# Patient Record
Sex: FEMALE | Race: WHITE | NOT HISPANIC OR LATINO | Employment: OTHER | URBAN - METROPOLITAN AREA
[De-identification: names, ages, dates, MRNs, and addresses within clinical notes are randomized per-mention and may not be internally consistent; named-entity substitution may affect disease eponyms.]

---

## 2023-05-17 LAB
HEPATITIS B SURFACE ANTIGEN (EXTERNAL): NEGATIVE
HEPATITIS C ANTIBODY (EXTERNAL): NEGATIVE
HIV1+2 AB SERPL QL IA: NEGATIVE
RUBELLA ANTIBODY IGG (EXTERNAL): NEGATIVE

## 2023-09-18 ENCOUNTER — TRANSFERRED RECORDS (OUTPATIENT)
Dept: HEALTH INFORMATION MANAGEMENT | Facility: CLINIC | Age: 33
End: 2023-09-18

## 2023-10-05 ENCOUNTER — MEDICAL CORRESPONDENCE (OUTPATIENT)
Dept: HEALTH INFORMATION MANAGEMENT | Facility: CLINIC | Age: 33
End: 2023-10-05
Payer: COMMERCIAL

## 2023-10-09 ENCOUNTER — TRANSCRIBE ORDERS (OUTPATIENT)
Dept: MATERNAL FETAL MEDICINE | Facility: CLINIC | Age: 33
End: 2023-10-09

## 2023-10-09 DIAGNOSIS — O26.90 PREGNANCY RELATED CONDITION, ANTEPARTUM: Primary | ICD-10-CM

## 2023-10-10 ENCOUNTER — TRANSFERRED RECORDS (OUTPATIENT)
Dept: HEALTH INFORMATION MANAGEMENT | Facility: CLINIC | Age: 33
End: 2023-10-10
Payer: COMMERCIAL

## 2023-10-10 LAB — GROUP B STREPTOCOCCUS (EXTERNAL): NEGATIVE

## 2023-10-11 DIAGNOSIS — O09.299 HX OF POSTPARTUM HEMORRHAGE, CURRENTLY PREGNANT: ICD-10-CM

## 2023-10-11 DIAGNOSIS — Z98.891 HISTORY OF C-SECTION: ICD-10-CM

## 2023-10-11 DIAGNOSIS — I77.74 VERTEBRAL ARTERY DISSECTION (H): Primary | ICD-10-CM

## 2023-10-22 ENCOUNTER — HEALTH MAINTENANCE LETTER (OUTPATIENT)
Age: 33
End: 2023-10-22

## 2023-10-24 ENCOUNTER — PRE VISIT (OUTPATIENT)
Dept: MATERNAL FETAL MEDICINE | Facility: CLINIC | Age: 33
End: 2023-10-24
Payer: COMMERCIAL

## 2023-10-24 ENCOUNTER — HOSPITAL ENCOUNTER (OUTPATIENT)
Dept: ULTRASOUND IMAGING | Facility: CLINIC | Age: 33
Discharge: HOME | End: 2023-10-24
Attending: STUDENT IN AN ORGANIZED HEALTH CARE EDUCATION/TRAINING PROGRAM
Payer: COMMERCIAL

## 2023-10-24 ENCOUNTER — HOSPITAL ENCOUNTER (OUTPATIENT)
Facility: CLINIC | Age: 33
Discharge: HOME | End: 2023-10-24
Attending: OBSTETRICS & GYNECOLOGY | Admitting: OBSTETRICS & GYNECOLOGY
Payer: COMMERCIAL

## 2023-10-24 ENCOUNTER — OFFICE VISIT (OUTPATIENT)
Dept: MATERNAL FETAL MEDICINE | Facility: CLINIC | Age: 33
End: 2023-10-24
Attending: STUDENT IN AN ORGANIZED HEALTH CARE EDUCATION/TRAINING PROGRAM
Payer: COMMERCIAL

## 2023-10-24 VITALS — TEMPERATURE: 98.6 F | DIASTOLIC BLOOD PRESSURE: 94 MMHG | RESPIRATION RATE: 24 BRPM | SYSTOLIC BLOOD PRESSURE: 135 MMHG

## 2023-10-24 VITALS
OXYGEN SATURATION: 97 % | HEART RATE: 79 BPM | RESPIRATION RATE: 18 BRPM | DIASTOLIC BLOOD PRESSURE: 95 MMHG | SYSTOLIC BLOOD PRESSURE: 143 MMHG

## 2023-10-24 DIAGNOSIS — O13.3 GESTATIONAL HYPERTENSION, THIRD TRIMESTER: Primary | ICD-10-CM

## 2023-10-24 DIAGNOSIS — I77.74 VERTEBRAL ARTERY DISSECTION (H): ICD-10-CM

## 2023-10-24 DIAGNOSIS — Z98.891 HISTORY OF C-SECTION: ICD-10-CM

## 2023-10-24 DIAGNOSIS — O09.299 HX OF POSTPARTUM HEMORRHAGE, CURRENTLY PREGNANT: ICD-10-CM

## 2023-10-24 PROBLEM — Z36.89 ENCOUNTER FOR TRIAGE IN PREGNANT PATIENT: Status: ACTIVE | Noted: 2023-10-24

## 2023-10-24 LAB
ALBUMIN MFR UR ELPH: 6.4 MG/DL
ALBUMIN SERPL BCG-MCNC: 3.6 G/DL (ref 3.5–5.2)
ALP SERPL-CCNC: 134 U/L (ref 35–104)
ALT SERPL W P-5'-P-CCNC: 23 U/L (ref 0–50)
ANION GAP SERPL CALCULATED.3IONS-SCNC: 11 MMOL/L (ref 7–15)
AST SERPL W P-5'-P-CCNC: 23 U/L (ref 0–45)
BILIRUB SERPL-MCNC: 0.2 MG/DL
BUN SERPL-MCNC: 8 MG/DL (ref 6–20)
CALCIUM SERPL-MCNC: 9.2 MG/DL (ref 8.6–10)
CHLORIDE SERPL-SCNC: 104 MMOL/L (ref 98–107)
CREAT SERPL-MCNC: 0.73 MG/DL (ref 0.51–0.95)
CREAT UR-MCNC: 52.4 MG/DL
DEPRECATED HCO3 PLAS-SCNC: 22 MMOL/L (ref 22–29)
EGFRCR SERPLBLD CKD-EPI 2021: >90 ML/MIN/1.73M2
ERYTHROCYTE [DISTWIDTH] IN BLOOD BY AUTOMATED COUNT: 13.2 % (ref 10–15)
GLUCOSE SERPL-MCNC: 87 MG/DL (ref 70–99)
HCT VFR BLD AUTO: 39.1 % (ref 35–47)
HGB BLD-MCNC: 13.1 G/DL (ref 11.7–15.7)
MCH RBC QN AUTO: 30.4 PG (ref 26.5–33)
MCHC RBC AUTO-ENTMCNC: 33.5 G/DL (ref 31.5–36.5)
MCV RBC AUTO: 91 FL (ref 78–100)
PLATELET # BLD AUTO: 253 10E3/UL (ref 150–450)
POTASSIUM SERPL-SCNC: 3.9 MMOL/L (ref 3.4–5.3)
PROT SERPL-MCNC: 6.4 G/DL (ref 6.4–8.3)
PROT/CREAT 24H UR: 0.12 MG/MG CR (ref 0–0.2)
RBC # BLD AUTO: 4.31 10E6/UL (ref 3.8–5.2)
SODIUM SERPL-SCNC: 137 MMOL/L (ref 135–145)
WBC # BLD AUTO: 9.8 10E3/UL (ref 4–11)

## 2023-10-24 PROCEDURE — 99205 OFFICE O/P NEW HI 60 MIN: CPT | Mod: 25 | Performed by: STUDENT IN AN ORGANIZED HEALTH CARE EDUCATION/TRAINING PROGRAM

## 2023-10-24 PROCEDURE — 80053 COMPREHEN METABOLIC PANEL: CPT

## 2023-10-24 PROCEDURE — 85027 COMPLETE CBC AUTOMATED: CPT

## 2023-10-24 PROCEDURE — 59025 FETAL NON-STRESS TEST: CPT

## 2023-10-24 PROCEDURE — 76811 OB US DETAILED SNGL FETUS: CPT

## 2023-10-24 PROCEDURE — G0463 HOSPITAL OUTPT CLINIC VISIT: HCPCS | Mod: 25 | Performed by: STUDENT IN AN ORGANIZED HEALTH CARE EDUCATION/TRAINING PROGRAM

## 2023-10-24 PROCEDURE — 84156 ASSAY OF PROTEIN URINE: CPT

## 2023-10-24 PROCEDURE — 59025 FETAL NON-STRESS TEST: CPT | Mod: 26 | Performed by: OBSTETRICS & GYNECOLOGY

## 2023-10-24 PROCEDURE — G0463 HOSPITAL OUTPT CLINIC VISIT: HCPCS | Mod: 25

## 2023-10-24 PROCEDURE — 76811 OB US DETAILED SNGL FETUS: CPT | Mod: 26 | Performed by: STUDENT IN AN ORGANIZED HEALTH CARE EDUCATION/TRAINING PROGRAM

## 2023-10-24 PROCEDURE — 36415 COLL VENOUS BLD VENIPUNCTURE: CPT

## 2023-10-24 PROCEDURE — 99214 OFFICE O/P EST MOD 30 MIN: CPT | Mod: 25 | Performed by: OBSTETRICS & GYNECOLOGY

## 2023-10-24 RX ORDER — ASPIRIN 81 MG/1
81 TABLET ORAL DAILY
Status: ON HOLD | COMMUNITY
End: 2023-10-28

## 2023-10-24 RX ORDER — LIDOCAINE 40 MG/G
CREAM TOPICAL
Status: DISCONTINUED | OUTPATIENT
Start: 2023-10-24 | End: 2023-10-24 | Stop reason: HOSPADM

## 2023-10-24 RX ORDER — LACTOBACILLUS RHAMNOSUS GG 10B CELL
1 CAPSULE ORAL 2 TIMES DAILY
COMMUNITY

## 2023-10-24 RX ORDER — CALCIUM CARBONATE 500 MG/1
1 TABLET, CHEWABLE ORAL 2 TIMES DAILY
COMMUNITY

## 2023-10-24 RX ORDER — SERTRALINE HYDROCHLORIDE 100 MG/1
100 TABLET, FILM COATED ORAL DAILY
COMMUNITY

## 2023-10-24 ASSESSMENT — ACTIVITIES OF DAILY LIVING (ADL)
ADLS_ACUITY_SCORE: 33
ADLS_ACUITY_SCORE: 33

## 2023-10-24 NOTE — NURSING NOTE
Anna here for L2 ultrasound and MFM consult at 37w4d related to history of vertebral artery dissection and hx HELLP with  . Medications and allergies reviewed. Mild range BPs noted in office. See flowsheets. Dr. Sarah Brock and Dr. Olivares met with patient to discuss plan, see separate note.

## 2023-10-24 NOTE — PROGRESS NOTES
Antepartum History and Physical   2023  Anna Smith  [id number]      HPI: Anna Smith is a 33 year old  at 37w4d by 8w4d US who presents from [facility] clinic for elevated blood pressures.     Patient was seen in [facility] clinic today for consultation regarding possible recent vertebral artery dissection following chiropractic session after presenting to the ED with headaches and hypertension (). Today in clinic she was found to have additional elevated blood pressures concerning for gestational hypertension.     We were able to review with her today the emergency room visit in September with Dr. Matthews during which she was evaluated by stroke neurologist who stated that her MRA findings were artifactual and the CT angiogram showed no flap or thrombus but a small area of possible dissection in the right vertebral artery.  At that time the stroke neurologist recommended taking 81 mg of aspirin and no further treatment or alternative therapy.  They did not feel she necessarily needed neurology follow-up.  Unfortunately at that time they did not discuss whether pushing and labor or hypertension in labor might be of any concerns for her.  It is of note she was hypertensive at the time of that emergency room visit on 2023.  The stroke neurologist's name was Dr. Calle.    She feels nervous, but overall well.  Reports no recent headaches.. Denies vision changes, chest pain, shortness of breath. Endorses some episodic upper abdominal pain, but reports mostly feels musculoskeletal due to fetal positioning. No LE swelling.     Patient notes that at time she initially presented to ED at the time of potential dissection diagnosis with HA she was in significant pain which she attributes as the etiology for her elevated blood pressures. She additionally notes they normalized after receiving analgesics. She notes her home blood pressures have all been normotensive.     Her pregnancy has been  complicated by:  - H/o preeclampsia without severe features in a prior pregnancy  - Concerns for vertebral artery dissection s/p chiropractic adjustment in pregnancy   - H/o CS x1, NRFHT s/p epidural   - H/o vaccum-assisted   - H/o PPH     Please note that the patient reports on discussion today is significant amount of trauma related to her prior births.  In her first delivery she required an emergency  for preeclampsia with severe features and also required magnesium treatment.  She reports a significant lack of control and the inability to bond with her baby immediately after delivery as it was taken from her immediately.  This was due to the emergency nature of her delivery.  In her second pregnancy she had a successful vaginal birth, but had a 2 L postpartum hemorrhage and again her child was taken from her to allow for surgical treatment of her hemorrhage.    The patient very much feels that structures in the traditional healthcare system that are set up to keep her safe make her feel unsafe based on her 2 prior experiences.  Those were not at this hospital.  She is very much aware that there is a high chance she will need a hospital delivery but this causes significant anxiety for her and she definitely has a PTSD component to her experiences.    Prenatal Care:  Primary OB care this pregnancy has been with [facility].  She was made very aware by either midwifery team that there was a high chance she would require a transfer for hospital birth.    She has gone back and forth about what a hospital delivery would look like for her she is very worried about nausea during pregnancy as this was very difficult in her prior labors.  She is also worried about being awake for delivery and what emotional sentiments that might bring up for her.  This was to the point that she even debated having a repeat  under general anesthesia but we did talk about why this does not seem in my opinion to be  the best plan for working through her trauma.    OBHX:   OB History    Para Term  AB Living   4 2 1 1 1 2   SAB IAB Ectopic Multiple Live Births   1 0 0 0 2      # Outcome Date GA Lbr Ayan/2nd Weight Sex Delivery Anes PTL Lv   4 Current            3 Term 22 40w4d / 02:58 3.487 kg (7 lb 11 oz) M Vag-Vacuum EPI N CHRISSY      Complications: Hemorrhage      Name: ARPAN CISNEROS      Apgar1: 8  Apgar5: 9   2 SAB 2020           1  18     CS-LTranv   CHRISSY       MedicalHX:   Past Medical History:   Diagnosis Date    History of delivery by vacuum extraction, currently pregnant     History of postpartum hemorrhage     Vertebral artery dissection (H24)        SurgicalHX:   Past Surgical History:   Procedure Laterality Date     SECTION         Medications:   No current facility-administered medications on file prior to encounter.  aspirin 81 MG EC tablet, Take 81 mg by mouth daily  B Complex-C (VITAMIN B COMPLEX W/VITAMIN C) TABS tablet, Take 1 tablet by mouth daily  cholecalciferol (VITAMIN D3) 125 mcg (5000 units) capsule, Take 125 mcg by mouth daily  lactobacillus rhamnosus, GG, (CULTURELL) capsule, Take 1 capsule by mouth 2 times daily  Prenatal Vit-Fe Fumarate-FA (PRENATAL MULTIVITAMIN  PLUS IRON) 27-1 MG TABS, Take 1 tablet by mouth daily  sertraline (ZOLOFT) 100 MG tablet, Take 100 mg by mouth daily  calcium carbonate (TUMS) 500 MG chewable tablet, Take 1 chew tab by mouth 2 times daily        Allergies:  Allergies   Allergen Reactions    Azithromycin Hives     Severity: Moderate; Type: Drug Allergy;    Erythromycin Hives    Latex Hives       FamilyHX:  History reviewed. No pertinent family history.    SocialHX:   Social History     Socioeconomic History    Marital status:      Spouse name: None    Number of children: None    Years of education: None    Highest education level: None   Tobacco Use    Smoking status: Never    Smokeless tobacco: Never   Substance and  Sexual Activity    Alcohol use: Not Currently    Drug use: Never    Sexual activity: Yes     Partners: Male       ROS: 10-point ROS negative except as indicated in HPI.    Physical Exam:  Vitals:    10/24/23 1644 10/24/23 1659 10/24/23 1714 10/24/23 1729   BP: 137/85 129/80 (!) 138/95 (!) 134/92   Resp: 24      Temp: 98.6  F (37  C)      TempSrc: Oral        General: alert, oriented female, resting in bed in NAD  CV: regular rate and rhythm, normal s1 and s2, no murmurs  Lungs: clear bilaterally, no crackles or wheezes  Abdomen: soft, gravid, non-tender  Extremities: bilateral lower extremities non-tender with trace edema    FHT: baseline 140,variability, +accelerations, no decelerations  [place]: Trace contractions     Prenatal ultrasounds:  PREGNANCY  ---------------------------------------------------------------------------------------------------------  Bhatia pregnancy. Number of fetuses: 1        DATING  ---------------------------------------------------------------------------------------------------------                                           Date                                Details                                                                                      Gest. age                      MARCOS  LMP                                  2/4/2023                                                                                                                           37 w + 3 d                     11/11/2023  Prior assessment               6/27/2023                         GA: 20 w + 2 d                                                                          37 w + 2 d                     11/12/2023  U/S                                   10/24/2023                       based upon AC, BPD, Femur, HC                                                37 w + 0 d                     11/14/2023  Assigned dating                  Dating performed on 10/24/2023, based on the LMP                                                             37 w + 3 d                     11/11/2023        GENERAL EVALUATION  ---------------------------------------------------------------------------------------------------------  Cardiac activity present.  bpm.  Fetal movements present.  Presentation cephalic.  Placenta Anterior, No Previa, > 2 cm from internal os. marginal cord insertion.  Umbilical cord 3 vessel cord.  Amniotic fluid Amount of AF: normal. MVP 5.7 cm.        FETAL BIOMETRY  ---------------------------------------------------------------------------------------------------------  Main Fetal Biometry:  BPD                                        96.1                    mm                         39w 2d                Hadlock  OFD                                        112.3                  mm                          34w 2d                Nicolaides  HC                                          329.2                  mm                          37w 3d                Hadlock  Cerebellum tr                            47.3                   mm                          -/-                Nicolaides  AC                                          318.2                  mm                          35w 5d        19%        Hadlock  Femur                                      68.8                   mm                          35w 2d                Hadlock  Humerus                                  59.3                    mm                         34w 3d                Lisa  Fetal Weight Calculation:  EFW                                       2,871                  g                                     28%        Hadlock  EFW (lb,oz)                             6 lb 5                  oz  EFW by                                        Hadlock (BPD-HC-AC-FL)  Head / Face / Neck Biometry:                                             5.7                     mm  CM                                          7.0                      mm  Nasal bone                               12.7                   mm  Urinary Tract Biometry:  Rt Renal pelvis ap                     1.3                     mm  Lt Renal pelvis ap                     1.2                      mm        FETAL ANATOMY  ---------------------------------------------------------------------------------------------------------  The following structures appear normal:  Head / Neck                         Cranium. Head size. Head shape. Lateral ventricles. Choroid plexus. Midline falx. Cavum septi pellucidi. Cerebellum. Cisterna magna.                                             Parenchyma. Thalami. Vermis.                                             Neck.  Face                                   Lips. Nose. Maxilla. Mandible. Orbits. Lens.  Heart / Thorax                      RVOT view. LVOT view. Situs. Bicaval view. Superior vena cava. Inferior vena cava. Cardiac position. Cardiac size. Cardiac rhythm.  Abdomen                             Abdominal wall. Cord insertion. Stomach. Kidneys. Bladder. Liver. Bowel.  Spine                                  Cervical spine. Thoracic spine. Lumbar spine.  Extremities / Skeleton          Right arm. Left hand. Right leg. Right foot. Left leg. Left foot.     The following structures could not be adequately visualized:  Face                                   Profile.  Heart / Thorax                      4-chamber view. Aortic arch view. Ductal arch view. 3-vessel view. 3-vessel-trachea view.                                             Right lung. Left lung. Diaphragm.  Spine                                  Sacral spine.  Extremities / Skeleton          Right hand. Left arm.     The following structures could not be visualized:  Abdomen                             Genitals.        MATERNAL STRUCTURES  ---------------------------------------------------------------------------------------------------------  Cervix                                   "Visualized                                             Appearance: Appears Closed                                             Approach - Transabdominal: Cervical length 41.2 mm  Right Ovary                          Not visualized  Left Ovary                            Not visualized    Prenatal Labs:      Lab Results   Component Value Date    HGB 13.1 10/24/2023       GBS Status:   No results found for: \"GBS\"    No results found for: \"PAP\"    Labs:   Results for orders placed or performed during the hospital encounter of 10/24/23 (from the past 24 hour(s))   CBC with platelets   Result Value Ref Range    WBC Count 9.8 4.0 - 11.0 10e3/uL    RBC Count 4.31 3.80 - 5.20 10e6/uL    Hemoglobin 13.1 11.7 - 15.7 g/dL    Hematocrit 39.1 35.0 - 47.0 %    MCV 91 78 - 100 fL    MCH 30.4 26.5 - 33.0 pg    MCHC 33.5 31.5 - 36.5 g/dL    RDW 13.2 10.0 - 15.0 %    Platelet Count 253 150 - 450 10e3/uL     Assessment: 33 year old  at 37w4d by 8w4d US, here for elevated blood pressures.     Plan:    # Gestational Hypertension   # H/o Preeclampsia w/o severe features  Transferred to L&D triage for evaluation the setting of elevated blood pressures   - Elevated pressures noted > 4 hours apart.  - HELLP labs nl, UPC 0.12  - Patient at this time meets criteria for gestational hypertension and meets criteria for delivery.     # C/f Vertebral artery dissection   Presented to ED with persistent HA and HTN s/p chiropractic adjustment   - Has not followed with neurology in this pregnancy. However per chart review, ED provider discussed findings with neurology faculty. [place] no need for further neurology follow up (Refer to 23 note for more information).    # Fetal well-being:  - Category I FHT  - GBS negative (per [facility] records    # PNC:     Patient seen and care plan discussed under supervision of Dr. Peck. Findings signed out to faculty and night team for further care management.     Sidra Mcgovern MD, MPH, MS  Obstetrics, " Gynecology & Women's Health   Resident, PGY-2  10/24/2023 5:46 PM      Attending attestation  I personally spent 30 minutes in face-to-face counseling and consultation with this patient today.  I reviewed her desires and preferences for this delivery in the context of the objective information that we have right now.  This information is as follows: She has had persistent mild elevations in her blood pressure which are concerning and most likely diagnostic for gestational hypertension.  She has lab evaluation which shows no evidence of help syndrome or preeclampsia with severe features.  This is a good thing.  However given her gestational age we do recommend that delivery be facilitated.  I have indicated to her that I strongly recommend delivery because she is at or after 37 weeks with new onset hypertension in pregnancy.  Her delivery preferences are complicated by the fact that we do not have additional follow-up by neurologist within our system to evaluate whether she can push during labor.  Unfortunately this was not addressed at the time that she was in the ER not surprisingly.  Therefore I discussed with her that prior to supporting her pushing during labor I would like to run her case by a neurologist and that will be difficult at this time of night.  But this would be preferred for me to allow her to proceed with a vaginal birth.  If our neurologist did not feel that this was safe for her, we would recommend a repeat  section and we talked through what that might look like.  She states the operating room is not surprisingly extremely traumatic for her based on her  and her postpartum hemorrhage, and we talked about things such as her wearing sunglasses for the bright lights and possibly listening to music or having aromatherapy.  She stated she might want a general anesthetic to avoid having to experience the trauma but I did encourage her strongly that the safest option might be regional  anesthetic with her partner in the room, possibly her  if she has 1, and allowing for immediate skin to skin with the baby at the time of delivery.  She is open to considering this.    After lengthy shared decision making the patient acknowledged our recommendation to stay and undergo induction of labor today but ultimately has opted to return home, to check her blood pressure both tonight and tomorrow morning, and to call our charge nurse in the morning if she is amenable to coming in for an induction of labor.  When she presents in the morning we will contact our neurology team to see if they need to see her in person or if they can make recommendations based on the documentation in her chart.  She is aware she can come back at any point day or night if her circumstances change or if she changes her mind.  She is aware that I would like her to stay in the hospital as I worry about the unpredictable progression of gestational hypertension.  However I do respect her decision and appreciate her willingness to return in the morning.    At this time the patient will plan for discharge home we will give her contact information for labor and delivery and she will anticipate return in the morning for an induction of labor, or  if otherwise indicated.    Sanna Peck MD

## 2023-10-25 ENCOUNTER — ANESTHESIA (OUTPATIENT)
Dept: OBGYN | Facility: CLINIC | Age: 33
End: 2023-10-25
Payer: COMMERCIAL

## 2023-10-25 ENCOUNTER — HOSPITAL ENCOUNTER (INPATIENT)
Facility: CLINIC | Age: 33
LOS: 3 days | Discharge: HOME | End: 2023-10-28
Attending: OBSTETRICS & GYNECOLOGY | Admitting: OBSTETRICS & GYNECOLOGY
Payer: COMMERCIAL

## 2023-10-25 ENCOUNTER — ANESTHESIA EVENT (OUTPATIENT)
Dept: OBGYN | Facility: CLINIC | Age: 33
End: 2023-10-25
Payer: COMMERCIAL

## 2023-10-25 ENCOUNTER — APPOINTMENT (OUTPATIENT)
Dept: CT IMAGING | Facility: CLINIC | Age: 33
End: 2023-10-25
Payer: COMMERCIAL

## 2023-10-25 DIAGNOSIS — Z98.891 S/P CESAREAN SECTION: Primary | ICD-10-CM

## 2023-10-25 DIAGNOSIS — O13.3 GESTATIONAL HYPERTENSION, THIRD TRIMESTER: ICD-10-CM

## 2023-10-25 PROBLEM — O13.9 GESTATIONAL HYPERTENSION: Status: ACTIVE | Noted: 2023-10-25

## 2023-10-25 LAB
ABO/RH(D): NORMAL
ANTIBODY SCREEN: NEGATIVE
ERYTHROCYTE [DISTWIDTH] IN BLOOD BY AUTOMATED COUNT: 13.2 % (ref 10–15)
HCT VFR BLD AUTO: 40 % (ref 35–47)
HGB BLD-MCNC: 13.6 G/DL (ref 11.7–15.7)
MCH RBC QN AUTO: 30.8 PG (ref 26.5–33)
MCHC RBC AUTO-ENTMCNC: 34 G/DL (ref 31.5–36.5)
MCV RBC AUTO: 91 FL (ref 78–100)
PLATELET # BLD AUTO: 264 10E3/UL (ref 150–450)
RBC # BLD AUTO: 4.42 10E6/UL (ref 3.8–5.2)
SARS-COV-2 RNA RESP QL NAA+PROBE: NEGATIVE
SPECIMEN EXPIRATION DATE: NORMAL
T PALLIDUM AB SER QL: NONREACTIVE
WBC # BLD AUTO: 9.7 10E3/UL (ref 4–11)

## 2023-10-25 PROCEDURE — 258N000003 HC RX IP 258 OP 636

## 2023-10-25 PROCEDURE — C9290 INJ, BUPIVACAINE LIPOSOME: HCPCS | Performed by: STUDENT IN AN ORGANIZED HEALTH CARE EDUCATION/TRAINING PROGRAM

## 2023-10-25 PROCEDURE — 360N000076 HC SURGERY LEVEL 3, PER MIN: Performed by: OBSTETRICS & GYNECOLOGY

## 2023-10-25 PROCEDURE — 272N000001 HC OR GENERAL SUPPLY STERILE: Performed by: OBSTETRICS & GYNECOLOGY

## 2023-10-25 PROCEDURE — 86780 TREPONEMA PALLIDUM: CPT

## 2023-10-25 PROCEDURE — 250N000011 HC RX IP 250 OP 636: Mod: JZ | Performed by: OBSTETRICS & GYNECOLOGY

## 2023-10-25 PROCEDURE — 250N000009 HC RX 250

## 2023-10-25 PROCEDURE — 120N000002 HC R&B MED SURG/OB UMMC

## 2023-10-25 PROCEDURE — 99223 1ST HOSP IP/OBS HIGH 75: CPT | Mod: 25 | Performed by: OBSTETRICS & GYNECOLOGY

## 2023-10-25 PROCEDURE — C9803 HOPD COVID-19 SPEC COLLECT: HCPCS

## 2023-10-25 PROCEDURE — 258N000003 HC RX IP 258 OP 636: Performed by: STUDENT IN AN ORGANIZED HEALTH CARE EDUCATION/TRAINING PROGRAM

## 2023-10-25 PROCEDURE — 86901 BLOOD TYPING SEROLOGIC RH(D): CPT

## 2023-10-25 PROCEDURE — 87635 SARS-COV-2 COVID-19 AMP PRB: CPT

## 2023-10-25 PROCEDURE — 70498 CT ANGIOGRAPHY NECK: CPT | Mod: 26 | Performed by: RADIOLOGY

## 2023-10-25 PROCEDURE — 85027 COMPLETE CBC AUTOMATED: CPT

## 2023-10-25 PROCEDURE — 999N000141 HC STATISTIC PRE-PROCEDURE NURSING ASSESSMENT: Performed by: OBSTETRICS & GYNECOLOGY

## 2023-10-25 PROCEDURE — 88304 TISSUE EXAM BY PATHOLOGIST: CPT | Mod: 26 | Performed by: PATHOLOGY

## 2023-10-25 PROCEDURE — 250N000011 HC RX IP 250 OP 636: Performed by: STUDENT IN AN ORGANIZED HEALTH CARE EDUCATION/TRAINING PROGRAM

## 2023-10-25 PROCEDURE — 250N000009 HC RX 250: Performed by: OBSTETRICS & GYNECOLOGY

## 2023-10-25 PROCEDURE — 710N000010 HC RECOVERY PHASE 1, LEVEL 2, PER MIN: Performed by: OBSTETRICS & GYNECOLOGY

## 2023-10-25 PROCEDURE — 250N000011 HC RX IP 250 OP 636: Mod: JZ | Performed by: STUDENT IN AN ORGANIZED HEALTH CARE EDUCATION/TRAINING PROGRAM

## 2023-10-25 PROCEDURE — 0UB50ZZ EXCISION OF RIGHT FALLOPIAN TUBE, OPEN APPROACH: ICD-10-PCS | Performed by: OBSTETRICS & GYNECOLOGY

## 2023-10-25 PROCEDURE — 271N000001 HC OR GENERAL SUPPLY NON-STERILE: Performed by: OBSTETRICS & GYNECOLOGY

## 2023-10-25 PROCEDURE — 59025 FETAL NON-STRESS TEST: CPT | Mod: 26 | Performed by: OBSTETRICS & GYNECOLOGY

## 2023-10-25 PROCEDURE — 70498 CT ANGIOGRAPHY NECK: CPT

## 2023-10-25 PROCEDURE — 88304 TISSUE EXAM BY PATHOLOGIST: CPT | Mod: TC

## 2023-10-25 PROCEDURE — 250N000009 HC RX 250: Performed by: STUDENT IN AN ORGANIZED HEALTH CARE EDUCATION/TRAINING PROGRAM

## 2023-10-25 PROCEDURE — 59514 CESAREAN DELIVERY ONLY: CPT | Mod: GC | Performed by: OBSTETRICS & GYNECOLOGY

## 2023-10-25 PROCEDURE — 99254 IP/OBS CNSLTJ NEW/EST MOD 60: CPT | Performed by: STUDENT IN AN ORGANIZED HEALTH CARE EDUCATION/TRAINING PROGRAM

## 2023-10-25 PROCEDURE — 250N000013 HC RX MED GY IP 250 OP 250 PS 637

## 2023-10-25 PROCEDURE — 70496 CT ANGIOGRAPHY HEAD: CPT | Mod: 26 | Performed by: RADIOLOGY

## 2023-10-25 PROCEDURE — 370N000017 HC ANESTHESIA TECHNICAL FEE, PER MIN: Performed by: OBSTETRICS & GYNECOLOGY

## 2023-10-25 PROCEDURE — 36415 COLL VENOUS BLD VENIPUNCTURE: CPT

## 2023-10-25 PROCEDURE — 250N000011 HC RX IP 250 OP 636

## 2023-10-25 RX ORDER — KETOROLAC TROMETHAMINE 30 MG/ML
30 INJECTION, SOLUTION INTRAMUSCULAR; INTRAVENOUS EVERY 6 HOURS
Status: DISPENSED | OUTPATIENT
Start: 2023-10-25 | End: 2023-10-26

## 2023-10-25 RX ORDER — ACETAMINOPHEN 325 MG/1
975 TABLET ORAL ONCE
Status: COMPLETED | OUTPATIENT
Start: 2023-10-25 | End: 2023-10-25

## 2023-10-25 RX ORDER — FENTANYL CITRATE 50 UG/ML
INJECTION, SOLUTION INTRAMUSCULAR; INTRAVENOUS
Status: COMPLETED | OUTPATIENT
Start: 2023-10-25 | End: 2023-10-25

## 2023-10-25 RX ORDER — NALOXONE HYDROCHLORIDE 0.4 MG/ML
0.4 INJECTION, SOLUTION INTRAMUSCULAR; INTRAVENOUS; SUBCUTANEOUS
Status: DISCONTINUED | OUTPATIENT
Start: 2023-10-25 | End: 2023-10-28 | Stop reason: HOSPADM

## 2023-10-25 RX ORDER — ONDANSETRON 2 MG/ML
4 INJECTION INTRAMUSCULAR; INTRAVENOUS EVERY 6 HOURS PRN
Status: DISCONTINUED | OUTPATIENT
Start: 2023-10-25 | End: 2023-10-25 | Stop reason: HOSPADM

## 2023-10-25 RX ORDER — ONDANSETRON 4 MG/1
4 TABLET, ORALLY DISINTEGRATING ORAL EVERY 6 HOURS PRN
Status: DISCONTINUED | OUTPATIENT
Start: 2023-10-25 | End: 2023-10-28 | Stop reason: HOSPADM

## 2023-10-25 RX ORDER — METOCLOPRAMIDE 10 MG/1
10 TABLET ORAL EVERY 6 HOURS PRN
Status: DISCONTINUED | OUTPATIENT
Start: 2023-10-25 | End: 2023-10-28 | Stop reason: HOSPADM

## 2023-10-25 RX ORDER — MISOPROSTOL 200 UG/1
800 TABLET ORAL
Status: DISCONTINUED | OUTPATIENT
Start: 2023-10-25 | End: 2023-10-25 | Stop reason: HOSPADM

## 2023-10-25 RX ORDER — OXYTOCIN/0.9 % SODIUM CHLORIDE 30/500 ML
340 PLASTIC BAG, INJECTION (ML) INTRAVENOUS CONTINUOUS PRN
Status: DISCONTINUED | OUTPATIENT
Start: 2023-10-25 | End: 2023-10-25 | Stop reason: HOSPADM

## 2023-10-25 RX ORDER — NALOXONE HYDROCHLORIDE 0.4 MG/ML
0.2 INJECTION, SOLUTION INTRAMUSCULAR; INTRAVENOUS; SUBCUTANEOUS
Status: DISCONTINUED | OUTPATIENT
Start: 2023-10-25 | End: 2023-10-28 | Stop reason: HOSPADM

## 2023-10-25 RX ORDER — BUPIVACAINE HYDROCHLORIDE 7.5 MG/ML
2 INJECTION, SOLUTION EPIDURAL; RETROBULBAR ONCE
Status: DISCONTINUED | OUTPATIENT
Start: 2023-10-25 | End: 2023-10-25 | Stop reason: HOSPADM

## 2023-10-25 RX ORDER — MISOPROSTOL 200 UG/1
800 TABLET ORAL
Status: DISCONTINUED | OUTPATIENT
Start: 2023-10-25 | End: 2023-10-28 | Stop reason: HOSPADM

## 2023-10-25 RX ORDER — SODIUM CHLORIDE, SODIUM LACTATE, POTASSIUM CHLORIDE, CALCIUM CHLORIDE 600; 310; 30; 20 MG/100ML; MG/100ML; MG/100ML; MG/100ML
INJECTION, SOLUTION INTRAVENOUS CONTINUOUS PRN
Status: DISCONTINUED | OUTPATIENT
Start: 2023-10-25 | End: 2023-10-25

## 2023-10-25 RX ORDER — CEFAZOLIN SODIUM/WATER 2 G/20 ML
2 SYRINGE (ML) INTRAVENOUS
Status: DISCONTINUED | OUTPATIENT
Start: 2023-10-25 | End: 2023-10-25 | Stop reason: HOSPADM

## 2023-10-25 RX ORDER — NALBUPHINE HYDROCHLORIDE 20 MG/ML
2.5-5 INJECTION, SOLUTION INTRAMUSCULAR; INTRAVENOUS; SUBCUTANEOUS EVERY 6 HOURS PRN
Status: DISCONTINUED | OUTPATIENT
Start: 2023-10-25 | End: 2023-10-28 | Stop reason: HOSPADM

## 2023-10-25 RX ORDER — OXYTOCIN 10 [USP'U]/ML
10 INJECTION, SOLUTION INTRAMUSCULAR; INTRAVENOUS
Status: DISCONTINUED | OUTPATIENT
Start: 2023-10-25 | End: 2023-10-25 | Stop reason: HOSPADM

## 2023-10-25 RX ORDER — BISACODYL 10 MG
10 SUPPOSITORY, RECTAL RECTAL DAILY PRN
Status: DISCONTINUED | OUTPATIENT
Start: 2023-10-27 | End: 2023-10-28 | Stop reason: HOSPADM

## 2023-10-25 RX ORDER — OXYTOCIN 10 [USP'U]/ML
10 INJECTION, SOLUTION INTRAMUSCULAR; INTRAVENOUS
Status: DISCONTINUED | OUTPATIENT
Start: 2023-10-25 | End: 2023-10-28 | Stop reason: HOSPADM

## 2023-10-25 RX ORDER — FENTANYL CITRATE 50 UG/ML
15 INJECTION, SOLUTION INTRAMUSCULAR; INTRAVENOUS ONCE
Status: DISCONTINUED | OUTPATIENT
Start: 2023-10-25 | End: 2023-10-25 | Stop reason: HOSPADM

## 2023-10-25 RX ORDER — ONDANSETRON 4 MG/1
4 TABLET, ORALLY DISINTEGRATING ORAL EVERY 6 HOURS PRN
Status: DISCONTINUED | OUTPATIENT
Start: 2023-10-25 | End: 2023-10-25 | Stop reason: HOSPADM

## 2023-10-25 RX ORDER — LIDOCAINE 40 MG/G
CREAM TOPICAL
Status: DISCONTINUED | OUTPATIENT
Start: 2023-10-25 | End: 2023-10-25 | Stop reason: HOSPADM

## 2023-10-25 RX ORDER — CARBOPROST TROMETHAMINE 250 UG/ML
250 INJECTION, SOLUTION INTRAMUSCULAR
Status: DISCONTINUED | OUTPATIENT
Start: 2023-10-25 | End: 2023-10-25 | Stop reason: HOSPADM

## 2023-10-25 RX ORDER — FENTANYL CITRATE-0.9 % NACL/PF 10 MCG/ML
100 PLASTIC BAG, INJECTION (ML) INTRAVENOUS EVERY 5 MIN PRN
Status: DISCONTINUED | OUTPATIENT
Start: 2023-10-25 | End: 2023-10-25 | Stop reason: HOSPADM

## 2023-10-25 RX ORDER — TRANEXAMIC ACID 10 MG/ML
1 INJECTION, SOLUTION INTRAVENOUS EVERY 30 MIN PRN
Status: DISCONTINUED | OUTPATIENT
Start: 2023-10-25 | End: 2023-10-28 | Stop reason: HOSPADM

## 2023-10-25 RX ORDER — BUPIVACAINE HYDROCHLORIDE 7.5 MG/ML
INJECTION, SOLUTION INTRASPINAL
Status: COMPLETED | OUTPATIENT
Start: 2023-10-25 | End: 2023-10-25

## 2023-10-25 RX ORDER — MODIFIED LANOLIN
OINTMENT (GRAM) TOPICAL
Status: DISCONTINUED | OUTPATIENT
Start: 2023-10-25 | End: 2023-10-28 | Stop reason: HOSPADM

## 2023-10-25 RX ORDER — AMOXICILLIN 250 MG
1 CAPSULE ORAL 2 TIMES DAILY
Status: DISCONTINUED | OUTPATIENT
Start: 2023-10-25 | End: 2023-10-28 | Stop reason: HOSPADM

## 2023-10-25 RX ORDER — IBUPROFEN 800 MG/1
800 TABLET, FILM COATED ORAL EVERY 6 HOURS
Status: DISCONTINUED | OUTPATIENT
Start: 2023-10-26 | End: 2023-10-28 | Stop reason: HOSPADM

## 2023-10-25 RX ORDER — DEXTROSE, SODIUM CHLORIDE, SODIUM LACTATE, POTASSIUM CHLORIDE, AND CALCIUM CHLORIDE 5; .6; .31; .03; .02 G/100ML; G/100ML; G/100ML; G/100ML; G/100ML
INJECTION, SOLUTION INTRAVENOUS CONTINUOUS
Status: DISCONTINUED | OUTPATIENT
Start: 2023-10-25 | End: 2023-10-28 | Stop reason: HOSPADM

## 2023-10-25 RX ORDER — KETOROLAC TROMETHAMINE 30 MG/ML
INJECTION, SOLUTION INTRAMUSCULAR; INTRAVENOUS PRN
Status: DISCONTINUED | OUTPATIENT
Start: 2023-10-25 | End: 2023-10-25

## 2023-10-25 RX ORDER — BUPIVACAINE HYDROCHLORIDE 2.5 MG/ML
INJECTION, SOLUTION EPIDURAL; INFILTRATION; INTRACAUDAL
Status: COMPLETED | OUTPATIENT
Start: 2023-10-25 | End: 2023-10-25

## 2023-10-25 RX ORDER — GLYCOPYRROLATE 0.2 MG/ML
INJECTION, SOLUTION INTRAMUSCULAR; INTRAVENOUS PRN
Status: DISCONTINUED | OUTPATIENT
Start: 2023-10-25 | End: 2023-10-25

## 2023-10-25 RX ORDER — DEXMEDETOMIDINE HYDROCHLORIDE 4 UG/ML
INJECTION, SOLUTION INTRAVENOUS PRN
Status: DISCONTINUED | OUTPATIENT
Start: 2023-10-25 | End: 2023-10-25

## 2023-10-25 RX ORDER — BUPIVACAINE HYDROCHLORIDE 2.5 MG/ML
INJECTION, SOLUTION EPIDURAL; INFILTRATION; INTRACAUDAL
Status: DISCONTINUED | OUTPATIENT
Start: 2023-10-25 | End: 2023-10-25

## 2023-10-25 RX ORDER — AMOXICILLIN 250 MG
2 CAPSULE ORAL 2 TIMES DAILY
Status: DISCONTINUED | OUTPATIENT
Start: 2023-10-25 | End: 2023-10-28 | Stop reason: HOSPADM

## 2023-10-25 RX ORDER — MORPHINE SULFATE 1 MG/ML
INJECTION, SOLUTION EPIDURAL; INTRATHECAL; INTRAVENOUS
Status: COMPLETED | OUTPATIENT
Start: 2023-10-25 | End: 2023-10-25

## 2023-10-25 RX ORDER — ACETAMINOPHEN 325 MG/1
975 TABLET ORAL EVERY 6 HOURS
Status: DISCONTINUED | OUTPATIENT
Start: 2023-10-25 | End: 2023-10-28 | Stop reason: HOSPADM

## 2023-10-25 RX ORDER — LIDOCAINE 40 MG/G
CREAM TOPICAL
Status: DISCONTINUED | OUTPATIENT
Start: 2023-10-25 | End: 2023-10-28 | Stop reason: HOSPADM

## 2023-10-25 RX ORDER — METHYLERGONOVINE MALEATE 0.2 MG/ML
200 INJECTION INTRAVENOUS
Status: DISCONTINUED | OUTPATIENT
Start: 2023-10-25 | End: 2023-10-25 | Stop reason: HOSPADM

## 2023-10-25 RX ORDER — MISOPROSTOL 200 UG/1
400 TABLET ORAL
Status: DISCONTINUED | OUTPATIENT
Start: 2023-10-25 | End: 2023-10-25 | Stop reason: HOSPADM

## 2023-10-25 RX ORDER — HYDROCORTISONE 25 MG/G
CREAM TOPICAL 3 TIMES DAILY PRN
Status: DISCONTINUED | OUTPATIENT
Start: 2023-10-25 | End: 2023-10-28 | Stop reason: HOSPADM

## 2023-10-25 RX ORDER — CITRIC ACID/SODIUM CITRATE 334-500MG
30 SOLUTION, ORAL ORAL
Status: COMPLETED | OUTPATIENT
Start: 2023-10-25 | End: 2023-10-25

## 2023-10-25 RX ORDER — METOCLOPRAMIDE HYDROCHLORIDE 5 MG/ML
10 INJECTION INTRAMUSCULAR; INTRAVENOUS EVERY 6 HOURS PRN
Status: DISCONTINUED | OUTPATIENT
Start: 2023-10-25 | End: 2023-10-28 | Stop reason: HOSPADM

## 2023-10-25 RX ORDER — CARBOPROST TROMETHAMINE 250 UG/ML
250 INJECTION, SOLUTION INTRAMUSCULAR
Status: DISCONTINUED | OUTPATIENT
Start: 2023-10-25 | End: 2023-10-28 | Stop reason: HOSPADM

## 2023-10-25 RX ORDER — METHYLERGONOVINE MALEATE 0.2 MG/ML
200 INJECTION INTRAVENOUS
Status: DISCONTINUED | OUTPATIENT
Start: 2023-10-25 | End: 2023-10-28 | Stop reason: HOSPADM

## 2023-10-25 RX ORDER — OXYTOCIN/0.9 % SODIUM CHLORIDE 30/500 ML
340 PLASTIC BAG, INJECTION (ML) INTRAVENOUS CONTINUOUS PRN
Status: DISCONTINUED | OUTPATIENT
Start: 2023-10-25 | End: 2023-10-28 | Stop reason: HOSPADM

## 2023-10-25 RX ORDER — SODIUM CHLORIDE, SODIUM LACTATE, POTASSIUM CHLORIDE, CALCIUM CHLORIDE 600; 310; 30; 20 MG/100ML; MG/100ML; MG/100ML; MG/100ML
INJECTION, SOLUTION INTRAVENOUS CONTINUOUS
Status: DISCONTINUED | OUTPATIENT
Start: 2023-10-25 | End: 2023-10-25 | Stop reason: HOSPADM

## 2023-10-25 RX ORDER — ONDANSETRON 2 MG/ML
4 INJECTION INTRAMUSCULAR; INTRAVENOUS EVERY 6 HOURS PRN
Status: DISCONTINUED | OUTPATIENT
Start: 2023-10-25 | End: 2023-10-28 | Stop reason: HOSPADM

## 2023-10-25 RX ORDER — OXYTOCIN/0.9 % SODIUM CHLORIDE 30/500 ML
PLASTIC BAG, INJECTION (ML) INTRAVENOUS CONTINUOUS PRN
Status: DISCONTINUED | OUTPATIENT
Start: 2023-10-25 | End: 2023-10-25

## 2023-10-25 RX ORDER — CEFAZOLIN SODIUM/WATER 2 G/20 ML
2 SYRINGE (ML) INTRAVENOUS SEE ADMIN INSTRUCTIONS
Status: DISCONTINUED | OUTPATIENT
Start: 2023-10-25 | End: 2023-10-25 | Stop reason: HOSPADM

## 2023-10-25 RX ORDER — OXYTOCIN/0.9 % SODIUM CHLORIDE 30/500 ML
100-340 PLASTIC BAG, INJECTION (ML) INTRAVENOUS CONTINUOUS PRN
Status: DISCONTINUED | OUTPATIENT
Start: 2023-10-25 | End: 2023-10-28 | Stop reason: HOSPADM

## 2023-10-25 RX ORDER — PROCHLORPERAZINE 25 MG
25 SUPPOSITORY, RECTAL RECTAL EVERY 12 HOURS PRN
Status: DISCONTINUED | OUTPATIENT
Start: 2023-10-25 | End: 2023-10-28 | Stop reason: HOSPADM

## 2023-10-25 RX ORDER — PROCHLORPERAZINE MALEATE 10 MG
10 TABLET ORAL EVERY 6 HOURS PRN
Status: DISCONTINUED | OUTPATIENT
Start: 2023-10-25 | End: 2023-10-28 | Stop reason: HOSPADM

## 2023-10-25 RX ORDER — MISOPROSTOL 200 UG/1
400 TABLET ORAL
Status: DISCONTINUED | OUTPATIENT
Start: 2023-10-25 | End: 2023-10-28 | Stop reason: HOSPADM

## 2023-10-25 RX ORDER — SIMETHICONE 80 MG
80 TABLET,CHEWABLE ORAL 4 TIMES DAILY PRN
Status: DISCONTINUED | OUTPATIENT
Start: 2023-10-25 | End: 2023-10-28 | Stop reason: HOSPADM

## 2023-10-25 RX ORDER — TRANEXAMIC ACID 10 MG/ML
1 INJECTION, SOLUTION INTRAVENOUS EVERY 30 MIN PRN
Status: DISCONTINUED | OUTPATIENT
Start: 2023-10-25 | End: 2023-10-25 | Stop reason: HOSPADM

## 2023-10-25 RX ORDER — MORPHINE SULFATE 1 MG/ML
150 INJECTION, SOLUTION EPIDURAL; INTRATHECAL; INTRAVENOUS ONCE
Status: DISCONTINUED | OUTPATIENT
Start: 2023-10-25 | End: 2023-10-25 | Stop reason: HOSPADM

## 2023-10-25 RX ORDER — IOPAMIDOL 755 MG/ML
100 INJECTION, SOLUTION INTRAVASCULAR ONCE
Status: COMPLETED | OUTPATIENT
Start: 2023-10-25 | End: 2023-10-25

## 2023-10-25 RX ADMIN — SODIUM CHLORIDE, POTASSIUM CHLORIDE, SODIUM LACTATE AND CALCIUM CHLORIDE: 600; 310; 30; 20 INJECTION, SOLUTION INTRAVENOUS at 20:02

## 2023-10-25 RX ADMIN — FENTANYL CITRATE 15 MCG: 50 INJECTION INTRAMUSCULAR; INTRAVENOUS at 20:11

## 2023-10-25 RX ADMIN — Medication 2 G: at 20:05

## 2023-10-25 RX ADMIN — SODIUM CHLORIDE, POTASSIUM CHLORIDE, SODIUM LACTATE AND CALCIUM CHLORIDE 100 ML/HR: 600; 310; 30; 20 INJECTION, SOLUTION INTRAVENOUS at 19:12

## 2023-10-25 RX ADMIN — BUPIVACAINE HYDROCHLORIDE IN DEXTROSE 1.8 ML: 7.5 INJECTION, SOLUTION SUBARACHNOID at 20:11

## 2023-10-25 RX ADMIN — IOPAMIDOL 67 ML: 755 INJECTION, SOLUTION INTRAVENOUS at 17:08

## 2023-10-25 RX ADMIN — ONDANSETRON 4 MG: 2 INJECTION INTRAMUSCULAR; INTRAVENOUS at 20:05

## 2023-10-25 RX ADMIN — Medication 100 ML/HR: at 21:52

## 2023-10-25 RX ADMIN — MORPHINE SULFATE 0.15 MG: 1 INJECTION EPIDURAL; INTRATHECAL; INTRAVENOUS at 20:11

## 2023-10-25 RX ADMIN — PHENYLEPHRINE HYDROCHLORIDE 50 MCG/MIN: 10 INJECTION INTRAVENOUS at 20:02

## 2023-10-25 RX ADMIN — BUPIVACAINE 20 ML: 13.3 INJECTION, SUSPENSION, LIPOSOMAL INFILTRATION at 21:40

## 2023-10-25 RX ADMIN — BUPIVACAINE HYDROCHLORIDE 20 ML: 2.5 INJECTION, SOLUTION EPIDURAL; INFILTRATION; INTRACAUDAL at 21:40

## 2023-10-25 RX ADMIN — SODIUM CHLORIDE, POTASSIUM CHLORIDE, SODIUM LACTATE AND CALCIUM CHLORIDE 500 ML: 600; 310; 30; 20 INJECTION, SOLUTION INTRAVENOUS at 18:40

## 2023-10-25 RX ADMIN — KETOROLAC TROMETHAMINE 30 MG: 30 INJECTION, SOLUTION INTRAMUSCULAR at 21:11

## 2023-10-25 RX ADMIN — SODIUM CITRATE AND CITRIC ACID MONOHYDRATE 30 ML: 500; 334 SOLUTION ORAL at 19:56

## 2023-10-25 RX ADMIN — GLYCOPYRROLATE 0.2 MG: 0.2 INJECTION, SOLUTION INTRAMUSCULAR; INTRAVENOUS at 20:05

## 2023-10-25 RX ADMIN — DEXMEDETOMIDINE 20 MCG: 100 INJECTION, SOLUTION, CONCENTRATE INTRAVENOUS at 21:08

## 2023-10-25 RX ADMIN — Medication 600 ML/HR: at 20:35

## 2023-10-25 RX ADMIN — DEXMEDETOMIDINE 20 MCG: 100 INJECTION, SOLUTION, CONCENTRATE INTRAVENOUS at 20:18

## 2023-10-25 RX ADMIN — SODIUM CHLORIDE 80 ML: 9 INJECTION, SOLUTION INTRAVENOUS at 17:09

## 2023-10-25 RX ADMIN — ACETAMINOPHEN 975 MG: 325 TABLET, FILM COATED ORAL at 19:12

## 2023-10-25 ASSESSMENT — ACTIVITIES OF DAILY LIVING (ADL)
ADLS_ACUITY_SCORE: 35
ADLS_ACUITY_SCORE: 20

## 2023-10-25 NOTE — ANESTHESIA PREPROCEDURE EVALUATION
Anesthesia Pre-Procedure Evaluation    Patient: Diane Smith   MRN: [id number] : 1990        Procedure : Procedure(s):   section          Past Medical History:   Diagnosis Date    History of delivery by vacuum extraction, currently pregnant     History of postpartum hemorrhage     Vertebral artery dissection (H24)       Past Surgical History:   Procedure Laterality Date     SECTION        Allergies   Allergen Reactions    Azithromycin Hives     Severity: Moderate; Type: Drug Allergy;    Erythromycin Hives    Latex Hives      Social History     Tobacco Use    Smoking status: Never    Smokeless tobacco: Never   Substance Use Topics    Alcohol use: Not Currently      Wt Readings from Last 1 Encounters:   No data found for Wt        Anesthesia Evaluation   Pt has had prior anesthetic. Type: General and OB Labor Epidural.    History of anesthetic complications  - PONV.      ROS/MED HX  ENT/Pulmonary:  - neg pulmonary ROS     Neurologic: Comment:   # Per chart, ongoing issues with neck pain and muscle spasms, requiring tylenol, flexeril, and few ED visits.   # Concern for Vertebral Artery Dissection secondary to chiropractic visit.  Based on unclear imaging, recommendation made to proceed with   # CTA of neck:  1. Focal narrowing of the right vertebral artery lumen at the V2-3 junction  image 324 series 6 may represent localized non-flow-limiting dissection. MRA  neck dissection protocol can be obtained for further evaluation.  2. No evidence of proximal artery occlusion, high grade stenosis, aneurysm, or  vascular malformation.  3. Final report per neurointerventional radiology service. [TP]         Cardiovascular: Comment: Multiple elevated blood pressure readings during this hospitalization.  Unclear if it is due to HTN or PTSD associated with previous hospitalizations  Hx/o HELLP  in prior regnancy   Per chart review, BP consistently elevated       METS/Exercise Tolerance:    "  Hematologic:  - neg hematologic  ROS     Musculoskeletal:       GI/Hepatic:  - neg GI/hepatic ROS     Renal/Genitourinary:       Endo:  - neg endo ROS     Psychiatric/Substance Use: Comment:   Procedural/situational anxiety - Patient reports traumatic experiences with deliveres in setting of 1st delivery resulting in a STAT csection, and then her second delivery via vaginal delivery but complicated with retained placenta requiring transfer to operating room. -- Reports PTSD but no nightmares.     (+) psychiatric history (PTSD with both prior deliveries) other (comment) and anxiety       Infectious Disease:       Malignancy:       Other:      (+)  , ,previous  (C Section under general), TOLAC candidate (complicated by post partum hemmoraghe)         Physical Exam    Airway  airway exam normal           Respiratory Devices and Support         Dental           Cardiovascular   cardiovascular exam normal          Pulmonary   pulmonary exam normal                OUTSIDE LABS:  CBC:   Lab Results   Component Value Date    WBC 9.8 10/24/2023    HGB 13.1 10/24/2023    HCT 39.1 10/24/2023     10/24/2023     BMP:   Lab Results   Component Value Date     10/24/2023    POTASSIUM 3.9 10/24/2023    CHLORIDE 104 10/24/2023    CO2 22 10/24/2023    BUN 8.0 10/24/2023    CR 0.73 10/24/2023    GLC 87 10/24/2023     COAGS: No results found for: \"PTT\", \"INR\", \"FIBR\"  POC: No results found for: \"BGM\", \"HCG\", \"HCGS\"  HEPATIC:   Lab Results   Component Value Date    ALBUMIN 3.6 10/24/2023    PROTTOTAL 6.4 10/24/2023    ALT 23 10/24/2023    AST 23 10/24/2023    ALKPHOS 134 (H) 10/24/2023    BILITOTAL 0.2 10/24/2023     OTHER:   Lab Results   Component Value Date    LOWELL 9.2 10/24/2023       Anesthesia Plan    ASA Status:  3    NPO Status:  Will be NPO Appropriate at ... 10/25/2023 8:00 PM   Anesthesia Type: Spinal.              Consents    Anesthesia Plan(s) and associated risks, benefits, and realistic alternatives " discussed. Questions answered and patient/representative(s) expressed understanding.     - Discussed: Risks, Benefits and Alternatives for BOTH SEDATION and the PROCEDURE were discussed     - Discussed with:  Patient      - Extended Intubation/Ventilatory Support Discussed: No.      - Patient is DNR/DNI Status: No     Use of blood products discussed: Yes.     - Discussed with: Patient.     - Consented: consented to blood products            Reason for refusal: other.     Postoperative Care    Pain management: Neuraxial analgesia, intrathecal morphine, Peripheral nerve block (Single Shot).   PONV prophylaxis: Ondansetron (or other 5HT-3)     Comments:           neg OB ROS.       Sree Altman MD

## 2023-10-25 NOTE — PROGRESS NOTES
"Please see \"Imaging\" tab under \"Chart Review\" for details of today's visit.    Sarah Brock    "

## 2023-10-25 NOTE — PLAN OF CARE
Goal Outcome Evaluation:  Pt declines Dr Peck' recommendation to stay at hospital for IOL. Pt wants to go home and came back tomorrow. Pt has unit L&D phone number; she will call to schedule her IOL tomorrow morning. Discharge instructions given. Pt states understanding of instructions. Discharge home at 1955

## 2023-10-25 NOTE — CONSULTS
[facility]  Neurology Consultation    Patient Name:  Diane Smith  MRN:  [id number]    :  1990  Date of Service:  2023  Primary care provider:  Ivett Cook      Neurology consultation service was asked to see Diane Smith by Dr. Peck to evaluate for vertebral dissection.    Chief Complaint:  neck pain, resolved prior to admission    History of Present Illness:   Diane Smith is a 33 year old female 37W5D who presents for delivery.  She did have neck pain and an outside angiogram of her neck that was read as having a possible vertebral artery dissection.  Neurology was consulted to comment on whether a vertebral artery dissection would influence her ability to push during labor, or whether a  section should be performed to reduce the amount of intrathoracic/intracranial pressure.    4-5 weeks ago she had been having some tension around her upper back, shoulder blades, and her midwife team recommended a consultation/evaluation with a chiropractor.  During this visit the chiropractor adjusted her cervical spine which was a surprise to the patient-she had not thought that this was going to happen and suspected that she was still in the evaluation rather than the treatment portion of her visit.  After this adjustment the location and nature of her neck pain change.  It then was focused on the right side of the neck, seemed to pulsate and rhythm with her heart, so she went to another chiropractor's office for advice.  He recommended she present to an emergency department where an MR angiogram was performed showing a small amount of focal narrowing at the right V2/V3 junction on 2023.  A CT angio was performed the following day which did not show evidence of a vertebral artery dissection.    Her neck pain continued for about 10 days, then self resolved.  It is not returned.  She was taking a low-dose aspirin throughout this time  "both for the vertebral artery dissection as well as for her preeclampsia.    ROS  A comprehensive ROS was performed and pertinent findings were included in HPI.     PMH  Past Medical History:   Diagnosis Date    History of delivery by vacuum extraction, currently pregnant     History of postpartum hemorrhage     Vertebral artery dissection (H24)      Past Surgical History:   Procedure Laterality Date     SECTION         Medications   I have personally reviewed the patient's medication list.     Allergies  I have personally reviewed the patient's allergy list.     Social History  Social History     Socioeconomic History    Marital status:      Spouse name: Not on file    Number of children: Not on file    Years of education: Not on file    Highest education level: Not on file   Occupational History    Not on file   Tobacco Use    Smoking status: Never    Smokeless tobacco: Never   Substance and Sexual Activity    Alcohol use: Not Currently    Drug use: Never    Sexual activity: Yes     Partners: Male   Other Topics Concern    Not on file   Social History Narrative    Not on file     Social Determinants of Health     Financial Resource Strain: Not on file   Food Insecurity: Not on file   Transportation Needs: Not on file   Physical Activity: Not on file   Stress: Not on file   Social Connections: Not on file   Interpersonal Safety: Not on file   Housing Stability: Not on file        Family History    History reviewed. No pertinent family history.       Physical Examination   Vitals: /85 (BP Location: Left arm, Patient Position: Semi-Leonard's, Cuff Size: Adult Regular)   Temp 98.8  F (37.1  C) (Oral)   Resp 20   Ht 1.613 m (5' 3.5\")   Wt 76.7 kg (169 lb)   LMP 2023   BMI 29.47 kg/m    General: Lying in bed, NAD  Head: NC/AT  Eyes: no icterus, op pink and moist  Cardiac: RRR. Extremities warm, no edema.   Respiratory: non-labored on RA  GI: S/NT/ND  Skin: No rash or lesion on exposed " skin  Psych: Mood pleasant, affect congruent  Neuro:  Mental status: Awake, alert, attentive, oriented to self, time, place, and circumstance. Language is fluent and coherent with intact comprehension of complex commands, naming and repetition.  Cranial nerves: VFF, PERRL, conjugate gaze, EOMI, facial sensation intact, face symmetric, shoulder shrug strong, tongue/uvula midline, no dysarthria.   Motor: Normal bulk and tone. No abnormal movements. 5/5 strength bilaterally in deltoids, biceps, triceps, hand , hip flexors, hip extensors, knee flexion, knee extension, plantarflexion, dorsiflexion.   Reflexes: Brisk throughout with positive Ward bilaterally, no clonus, and toes are downgoing.  Patellar reflexes can easily be elicited superior to the patella bilaterally with light finger tapping, same goes for the upper extremities.  Sensory: Intact to light touch in proximal and distal aspects of all 4 extremities   Coordination: FNF and HS without ataxia or dysmetria. Rapid alternating movements intact.   Gait: Deferred    Investigations   I have personally reviewed pertinent labs, tests, and radiological imaging. Discussion of notable findings is included under Impression.     CTA head neck 10/25/2023  Impression:    1. Neck CTA demonstrates no evidence of vertebral artery dissection.  No stenosis or aneurysm.  2. Head CTA demonstrates no evidence of vertebral artery dissection.  No stenosis of the major intracranial arteries.     Was patient transferred from outside hospital?   No    Impression  #Query vertebral artery dissection on the right  #No evidence of vertebral artery dissection on CTA head or neck    Neurology was asked to see Ms. Smith regarding whether she may have an ongoing vertebral artery dissection that would inform her risk of vaginal delivery versus  section versus associated anesthetic interventions.  We repeated her CTA of the head and neck to assess for any change, though no  dissection is visible on this imaging.    My suspicion is high for imaging artifact leading to the original visualization of vertebral artery narrowing on the MR angio performed .  Given that there is no evidence of a dissection, from a neurologic perspective she is not in any particularly elevated risk for either vaginal delivery,  section, or associated anesthesia procedures.    Recommendations  -No current indications for aspirin from the perspective of arterial dissection (though I understand aspirin may continue to be recommended for other OB/GYN/MFM indications)  - No current indications for repeat head or neck imaging  - Neurology will sign off at this time, please call if any new questions arise    Thank you for involving Neurology in the care of Diane Smith.  Please do not hesitate to call with questions.    Gurinder Meza MD    I spent 70 minutes on this consultation including discussion with the primary maternal-fetal medicine team, evaluation of the patient, discussion with the patient and her , review of past records including MRI and CT imaging of her head and neck.

## 2023-10-25 NOTE — PLAN OF CARE
Goal Outcome Evaluation:  Data: Patient presented to [facility] at 1628. Reason for maternal/fetal assessment per patient is Rule Out Pre-eclampsia  Patient is a . Prenatal record reviewed.      OB History    Para Term  AB Living   4 2 1 1 1 2   SAB IAB Ectopic Multiple Live Births   1 0 0 0 2      # Outcome Date GA Lbr Ayan/2nd Weight Sex Delivery Anes PTL Lv   4 Current            3 Term 22 40w4d / 02:58 3.487 kg (7 lb 11 oz) M Vag-Vacuum EPI N CHRISSY      Complications: Hemorrhage      Name: ARPAN CISNEROS      Apgar1: 8  Apgar5: 9   2 SAB 2020           1  18     CS-LTranv   CHRISSY   . Medical history:   Past Medical History:   Diagnosis Date    History of delivery by vacuum extraction, currently pregnant     History of postpartum hemorrhage     Vertebral artery dissection (H24)    . Gestational Age 37w4d. VSS. Fetal movement present. Patient denies backache, vaginal discharge, pelvic pressure, UTI symptoms, GI problems, bloody show, vaginal bleeding, edema, headache, visual disturbances, epigastric or URQ pain, abdominal pain, rupture of membranes.  Action: Verbal consent for EFM. Triage assessment completed. EFM applied for monitoring. Uterine assessment irreg. Fetal assessment: Presumed adequate fetal oxygenation documented (see flow record).   Response: Dr. Peck informed of pt arrival and status.

## 2023-10-25 NOTE — PROGRESS NOTES
Data: Patient presented to [facility] at 1406.   Reason for maternal/fetal assessment per patient is Gestational Hypertension  .  Patient is a . Prenatal record reviewed.      OB History    Para Term  AB Living   4 2 1 1 1 2   SAB IAB Ectopic Multiple Live Births   1 0 0 0 2      # Outcome Date GA Lbr Ayan/2nd Weight Sex Delivery Anes PTL Lv   4 Current            3 Term 22 40w4d / 02:58 3.487 kg (7 lb 11 oz) M Vag-Vacuum EPI N CHRISSY      Complications: Hemorrhage      Name: ARPAN CISNEROS      Apgar1: 8  Apgar5: 9   2 SAB 2020           1  18     CS-LTranv   CHRISSY   . Medical history:   Past Medical History:   Diagnosis Date    History of delivery by vacuum extraction, currently pregnant     History of postpartum hemorrhage     Vertebral artery dissection (H24)    . Gestational Age 37w5d. Pt arrived to unit for delivery for gHTN.  Pt needs a head and neck CT scan to determine if she can push. If she can push, she can get an induction. If she can not, the plan is to proceed with a . VSS. Fetal movement present. Patient denies cramping, backache, vaginal discharge, pelvic pressure, UTI symptoms, GI problems, bloody show, vaginal bleeding, edema, headache, visual disturbances, epigastric or URQ pain, abdominal pain, rupture of membranes. Support persons, Jonh (), is present at bedside.   Action: Verbal consent for EFM. Triage assessment completed. EFM applied. Fetal assessment: Presumed adequate fetal oxygenation documented (see flow record). Morrison shows infrequent contractions.   Response:  arrived to bedside to speak with patient. Patient verbalized that she would prefer a  at this time instead of an induction. Pt says she feels more comfortable with receiving a  at this time. Plan is to move forward with a .

## 2023-10-25 NOTE — DISCHARGE INSTRUCTIONS
Discharge Instruction for Undelivered Patients      You were seen for:  Rule out Pre-Eclampsia  We Consulted: Dr Peck  You had (Test or Medicine):Labs fetal monitroring    Call your provider if you notice:  Swelling in your face or increased swelling in your hands or legs.  Headaches that are not relieved by Tylenol (acetaminophen).  Changes in your vision (blurring: seeing spots or stars.)  Nausea (sick to your stomach) and vomiting (throwing up).   Weight gain of 5 pounds or more per week.  Heartburn that doesn't go away.  Signs of bladder infection: pain when you urinate (use the toilet), need to go more often and more urgently.  The bag of garcía (rupture of membranes) breaks, or you notice leaking in your underwear.  Bright red blood in your underwear.  Abdominal (lower belly) or stomach pain.  For first baby: Contractions (tightening) less than 5 minutes apart for one hour or more.  Second (plus) baby: Contractions (tightening) less than 10 minutes apart and getting stronger.  *If less than 34 weeks: Contractions (tightening) more than 6 times in one hour.  Increase or change in vaginal discharge (note the color and amount)    Follow-up:  Come to hospital for IOL 10/25; please call before coming to the hospital. [phone]

## 2023-10-25 NOTE — PROVIDER NOTIFICATION
10/25/23 1444   Provider Notification   Provider Name/Title    Method of Notification Electronic Page   Notification Reason Patient Arrived     Pt arrived for likely IOL for gHTN. 37w5d. .

## 2023-10-25 NOTE — H&P
[facility]  OBGYN History and Physical       Name: Diane Smith MRN# [id number]   YOB: 1990 Age: 33 year old      Date of Admission: 10/25/2023    Primary care provider: Ivett Cook      Assessment and Plan:       33 year old  at 37w5d by LMP c/w 8w4d US who presents today for delivery. Initially presented for induction of labor, however given the complexity of her previous deliveries opted to move forward with a planned  section.     This patient was evaluated on 10/24/2023 for an M consultation at which time she was determined to carry a new diagnosis of gestational hypertension.  Given her gestational age of greater than 37 weeks delivery was recommended.  After shared decision making the patient opted for patient directed discharge as she was very much caught off guard by her diagnosis and the recommendation.  Please see her full note from yesterday for additional details    # Repeat  Section anticipated, patient request  # H/o  Section, fetal bradycardia with traumatic birth and PTSD  # H/o vac-assisted , with traumatic birth and PTSD  # H/o PPH c/b D&C   Moving forward with unscheduled repeat  section for maternal request.. Prior  completed STAT in the setting of fetal bradycardia s/p epidural placement. Unable to receive previous operative note. However based on records procedure PLTCS. Will precede forward with Pfannenstiel skin incision with low transverse hysterotomy  - Labs: CBC, TSP    - Pre-op Hgb 13.1, Plts 253  - Placenta: Anterior with marginal cord insertion  - Anesthesia: Spinal  - 2g Ancef   - PPH Meds/Ppx: Will plan to avoid methergine  - Diet: NPO  - PPx: SCDs  - Consent: Discussed risks and benefits of procedure, including but not limited to bleeding, infection, injury to surrounding organs, injury to infant, and the potential need for another surgery should some injury  go unrecognized or patient were to have continued bleeding. Patient had time to ask questions and expressed understanding of procedure and associated risks. Agreed to blood transfusion if necessary. Consent signed.  Last ate small bowel of cereal around 1130 am - will discuss with anesthesia when CS can occur  -discussed case with Dr. Li, OB/Gyn surgeon for delivery    # gHTN  Meet criteria for gestational hypertension as of 10/24.  No evidence of preeclampsia.  -  Serial BP monitoring   - IV Antihypertensives prn for sustained severe range blood pressures (>160/>110), none present at this time  - Labs: HELLP nl, UPC 0.24 (10/24) we will repeat labs given plan for surgical delivery later today.  - Daily weights, strict I&Os    #Possible vertebral dissection  - reviewed prior ED visit, unclear diagnosis and no recommendations for delivery  - neurology consultation prior to CS   - may need imaging will defer to neurology for imaging and to see if ongoing low dose aspirin is also recommended    # FWB:   Cat 1 tracing, 130 baseline; cephalic by US in MFM yesterday;    - NICU for delivery due to plan for C/S      #PTSD/anxiety  - discussed triggers during delivery given PTSD from prior births - music if wanted, sunglasses for bright OR lights,  at side, aromatherapy  - will discuss with anesthesia her primary trigger which is nausea and vomiting for pre-treatment before spinal, will give Zofran now as well as IVF  - discussed risk of PP depression and need for close surveillance - unplanned pregnancy and notes has had trouble connecting with this pregnancy, discussed option for referral to behavioral health with  focus if desired, including inpatient  - continue PTA medications      I personally saw and examined this patient. I agree with plan for admission and delivery for gestational hypertension at 37 weeks.       Physician Attestation   I, Sanna Peck MD, saw this patient.I personally  reviewed vital signs, medications, and labs and examined her and discussed the plan with her and her .     Key findings: Gestational hypertension, mild at 37+ weeks. Prior C/S desires repeat. Obstetric PTSD/trauma, support as needed.      Sanna Peck MD  Date of Service (when I saw the patient): .10/25/23     Time Spent on this Encounter   I personally spent a total of 30 minutes, including both face-to-face and non-face-to-face on the date of the admission, addressing the above diagnosis. Activities performed in this time include chart review, obtaining / reviewing history, performing a medically necessary evaluation, documentation and Charge activities: counseling and care coordination, equivalent to medical decision making that is of moderate complexity.                       HPI:     Diane Smith is a 33 year old  at 37w5d  who presents today for delivery.     Pregnancy notable for:   Diagnosis of potential vertebral artery dissection on ER evaluation 2023.  Imaging at that time along with curbside consult with stroke neurologist suspected likely artifactual findings on MRA and CT angiogram with potential tiny area of dissection for which no substantial follow-up has been recommended with neurology.  She was continued on low-dose aspirin.  No comment on route of delivery or safety of pushing at delivery.  This happened after her very first visit to a chiropractor with neck manipulation.  She has no residual neck pain or deficits related to the suspected dissection.  There is no family history of of vascular dissection.    Her previous  delivery was notable for delivery in the late  period due to preeclampsia with severe features.  This was very traumatic for her and she did require magnesium and separation from her child..  Although she did not have preeclampsia in her second delivery, which was a TOLAC at term, she ended up with an operative vaginal delivery and a  "postpartum hemorrhage which required surgical intervention.  She was again  from her infant and her .  She has very significant trauma from these 2 deliveries and has been doing lots of work related to this.    She reports normal fetal movement, no headaches vision changes or epigastric pain.  She has had no contractions or vaginal bleeding or leaking of fluid.    Patient has been NPO since 1130 this morning when she had a small amount of cereal to treat her nausea.    The patient was seen yesterday at which time she was diagnosed with gestational hypertension and those records were reviewed today.  She had a lengthy discussion with her midwife at [facility] since that time and has decided that after considering the options for induction of labor and repeat  section, that she feels like the right decision for her is a  section.  Her  is with her today and is supportive and at the bedside.    OB History:    OB History    Para Term  AB Living   4 2 1 1 1 2   SAB IAB Ectopic Multiple Live Births   1 0 0 0 2      # Outcome Date GA Lbr Ayan/2nd Weight Sex Delivery Anes PTL Lv   4 Current            3 Term 22 40w4d / 02:58 3.487 kg (7 lb 11 oz) M Vag-Vacuum EPI N CHRISSY      Complications: Hemorrhage      Name: ARPAN CISNEROS      Apgar1: 8  Apgar5: 9   2 SAB            1  18     CS-LTranv   CHRISSY        Prenatal Lab Results:  Lab Results   Component Value Date    HGB 13.1 10/24/2023       GBS Status:   No results found for: \"GBS\"             Past Medical History:     Past Medical History:   Diagnosis Date    History of delivery by vacuum extraction, currently pregnant     History of postpartum hemorrhage     Vertebral artery dissection (H24)              Past Surgical History:     Past Surgical History:   Procedure Laterality Date     SECTION               Social History:     Social History     Tobacco Use    Smoking " status: Never    Smokeless tobacco: Never   Substance Use Topics    Alcohol use: Not Currently             Family History:   History reviewed. No pertinent family history.          Immunizations:     Immunization History   Administered Date(s) Administered    COVID-19 12+ (2023-24) (MODERNA) 10/10/2023    COVID-19 Monovalent 18+ (Moderna) 04/22/2021, 05/19/2021, 12/01/2021            Allergies:     Allergies   Allergen Reactions    Azithromycin Hives     Severity: Moderate; Type: Drug Allergy;    Erythromycin Hives    Latex Hives             Medications:     Medications Prior to Admission   Medication Sig Dispense Refill Last Dose    aspirin 81 MG EC tablet Take 81 mg by mouth daily   10/24/2023    B Complex-C (VITAMIN B COMPLEX W/VITAMIN C) TABS tablet Take 1 tablet by mouth daily   10/25/2023 at 0800    calcium carbonate (TUMS) 500 MG chewable tablet Take 1 chew tab by mouth 2 times daily   10/24/2023 at 0800    cholecalciferol (VITAMIN D3) 125 mcg (5000 units) capsule Take 125 mcg by mouth daily   10/24/2023 at 2145    lactobacillus rhamnosus, GG, (CULTURELL) capsule Take 1 capsule by mouth 2 times daily   10/25/2023 at 0800    Prenatal Vit-Fe Fumarate-FA (PRENATAL MULTIVITAMIN  PLUS IRON) 27-1 MG TABS Take 1 tablet by mouth daily   10/24/2023 at 2145    sertraline (ZOLOFT) 100 MG tablet Take 100 mg by mouth daily   10/24/2023 at 2145             Review of Systems & Physical Exam:     The Review of Systems is negative other than noted in the HPI      /85 (BP Location: Left arm, Patient Position: Semi-Leonard's, Cuff Size: Adult Regular)   Temp 98.8  F (37.1  C) (Oral)   Resp 20   LMP 02/04/2023   Gen: alert and oriented, no apparent distress  CV: RRR, nl S1/S2, no murmurs/clicks/gallops  Lungs: CTAB, non-labored breathing  Abd: Gravid, non-tender, non-distended  Ext: trace peripheral extremity edema; 1+ DTRs, no clonus    Presentation: cephalic by US on 10/24/23.  Estimated Fetal Weight: 2.8 kg, 28%.      FHT:  Monitoring External  FHT: Baseline 130 bpm; moderate variability; accels present; no decelerations  TOCO rare contractions , irregular         Data:        Lab Results   Component Value Date     10/24/2023    Lab Results   Component Value Date    CHLORIDE 104 10/24/2023    Lab Results   Component Value Date    BUN 8.0 10/24/2023      Lab Results   Component Value Date    POTASSIUM 3.9 10/24/2023    Lab Results   Component Value Date    CO2 22 10/24/2023    Lab Results   Component Value Date    CR 0.73 10/24/2023        Lab Results   Component Value Date    WBC 9.8 10/24/2023    HGB 13.1 10/24/2023    HCT 39.1 10/24/2023    MCV 91 10/24/2023     10/24/2023     Lab Results   Component Value Date    CR 0.73 10/24/2023     Lab Results   Component Value Date     10/24/2023    POTASSIUM 3.9 10/24/2023    CHLORIDE 104 10/24/2023    CO2 22 10/24/2023    GLC 87 10/24/2023     Lab Results   Component Value Date    GLC 87 10/24/2023     Lab Results   Component Value Date    HGB 13.1 10/24/2023     Lab Results   Component Value Date    WBC 9.8 10/24/2023    Urine protein creatinine ratio on 10/24/23 < 0.3     Sidra Mcgovern MD, MPH, MS  Obstetrics, Gynecology & Women's Health   Resident, PGY-2  10/25/2023 3:35 PM        Physician Attestation   I saw this patient with the resident and agree with the resident/fellow's findings and plan of care as documented in the note.  I personally saw and examined this patient and spent 45 minutes in face-to-face counseling, consultation and examination.  She is well-known to me from her visit yesterday and so no significant chart review was required.    Key findings: Patient with prior  section and PTSD from traumatic birth now at 37 weeks gestation with gestational hypertension and recommendation for delivery.  Overall patient is doing well with reasonable control of her PTSD symptoms and has opted for a surgical delivery via repeat  section which we  support.  Her fetal assessment is reassuring.  We will await clearance and any potential imaging from neurology to determine what next steps are required for moving forward with her  which will likely be undertaken after she is cleared from meeting for anesthesia.  Anesthesia has also been notified of her admission.    Medical complexity over the past 24 hours:  - Decision regarding EMERGENCY MAJOR SURGERY  Decision for how best to support complex trauma related to prior medical procedures  Decision on safety with diagnosis of gestational hypertension  Decision on evaluation for vertebral artery dissection history during pregnancy.    I have personally reviewed the following data over the past 24 hrs:    9.8  \   13.1   / 253     137 104 8.0 /  87   3.9 22 0.73 \     ALT: 23 AST: 23 AP: 134 (H) TBILI: 0.2   ALB: 3.6 TOT PROTEIN: 6.4 LIPASE: N/A         Sanna Peck MD  Date of Service (when I saw the patient): 10/25/23

## 2023-10-26 LAB — HGB BLD-MCNC: 11 G/DL (ref 11.7–15.7)

## 2023-10-26 PROCEDURE — 36415 COLL VENOUS BLD VENIPUNCTURE: CPT

## 2023-10-26 PROCEDURE — 250N000013 HC RX MED GY IP 250 OP 250 PS 637

## 2023-10-26 PROCEDURE — 99232 SBSQ HOSP IP/OBS MODERATE 35: CPT | Performed by: OBSTETRICS & GYNECOLOGY

## 2023-10-26 PROCEDURE — 120N000002 HC R&B MED SURG/OB UMMC

## 2023-10-26 PROCEDURE — 85018 HEMOGLOBIN: CPT

## 2023-10-26 PROCEDURE — 250N000011 HC RX IP 250 OP 636: Mod: JZ

## 2023-10-26 PROCEDURE — 250N000011 HC RX IP 250 OP 636: Performed by: STUDENT IN AN ORGANIZED HEALTH CARE EDUCATION/TRAINING PROGRAM

## 2023-10-26 RX ORDER — OXYCODONE HYDROCHLORIDE 5 MG/1
5 TABLET ORAL EVERY 4 HOURS PRN
Status: DISCONTINUED | OUTPATIENT
Start: 2023-10-26 | End: 2023-10-26

## 2023-10-26 RX ORDER — OXYCODONE HYDROCHLORIDE 5 MG/1
5-10 TABLET ORAL EVERY 4 HOURS PRN
Status: DISCONTINUED | OUTPATIENT
Start: 2023-10-26 | End: 2023-10-28 | Stop reason: HOSPADM

## 2023-10-26 RX ORDER — HYDROMORPHONE HCL IN WATER/PF 6 MG/30 ML
0.2 PATIENT CONTROLLED ANALGESIA SYRINGE INTRAVENOUS
Status: DISCONTINUED | OUTPATIENT
Start: 2023-10-26 | End: 2023-10-28 | Stop reason: HOSPADM

## 2023-10-26 RX ORDER — LIDOCAINE 4 G/G
2 PATCH TOPICAL
Status: DISCONTINUED | OUTPATIENT
Start: 2023-10-26 | End: 2023-10-28 | Stop reason: HOSPADM

## 2023-10-26 RX ORDER — ASPIRIN 81 MG/1
81 TABLET ORAL DAILY
Status: DISCONTINUED | OUTPATIENT
Start: 2023-10-26 | End: 2023-10-26

## 2023-10-26 RX ORDER — SERTRALINE HYDROCHLORIDE 100 MG/1
100 TABLET, FILM COATED ORAL DAILY
Status: DISCONTINUED | OUTPATIENT
Start: 2023-10-26 | End: 2023-10-28 | Stop reason: HOSPADM

## 2023-10-26 RX ADMIN — NALBUPHINE HYDROCHLORIDE 2.5 MG: 20 INJECTION, SOLUTION INTRAMUSCULAR; INTRAVENOUS; SUBCUTANEOUS at 03:12

## 2023-10-26 RX ADMIN — SIMETHICONE 80 MG: 80 TABLET, CHEWABLE ORAL at 08:45

## 2023-10-26 RX ADMIN — ACETAMINOPHEN 975 MG: 325 TABLET, FILM COATED ORAL at 01:18

## 2023-10-26 RX ADMIN — ASPIRIN 81 MG: 81 TABLET, COATED ORAL at 14:56

## 2023-10-26 RX ADMIN — OXYCODONE HYDROCHLORIDE 5 MG: 5 TABLET ORAL at 22:29

## 2023-10-26 RX ADMIN — ACETAMINOPHEN 975 MG: 325 TABLET, FILM COATED ORAL at 22:29

## 2023-10-26 RX ADMIN — KETOROLAC TROMETHAMINE 30 MG: 30 INJECTION, SOLUTION INTRAMUSCULAR; INTRAVENOUS at 03:04

## 2023-10-26 RX ADMIN — SENNOSIDES AND DOCUSATE SODIUM 2 TABLET: 50; 8.6 TABLET ORAL at 19:39

## 2023-10-26 RX ADMIN — KETOROLAC TROMETHAMINE 30 MG: 30 INJECTION, SOLUTION INTRAMUSCULAR; INTRAVENOUS at 09:15

## 2023-10-26 RX ADMIN — IBUPROFEN 800 MG: 800 TABLET ORAL at 17:50

## 2023-10-26 RX ADMIN — ACETAMINOPHEN 975 MG: 325 TABLET, FILM COATED ORAL at 14:56

## 2023-10-26 RX ADMIN — SERTRALINE HYDROCHLORIDE 100 MG: 100 TABLET ORAL at 12:43

## 2023-10-26 RX ADMIN — ACETAMINOPHEN 975 MG: 325 TABLET, FILM COATED ORAL at 08:45

## 2023-10-26 RX ADMIN — SENNOSIDES AND DOCUSATE SODIUM 1 TABLET: 50; 8.6 TABLET ORAL at 08:45

## 2023-10-26 RX ADMIN — LIDOCAINE 2 PATCH: 4 PATCH TOPICAL at 22:30

## 2023-10-26 ASSESSMENT — ACTIVITIES OF DAILY LIVING (ADL)
ADLS_ACUITY_SCORE: 24
ADLS_ACUITY_SCORE: 20
ADLS_ACUITY_SCORE: 24

## 2023-10-26 NOTE — PLAN OF CARE
Problem: Adult Inpatient Plan of Care  Goal: Optimal Comfort and Wellbeing  Outcome: Progressing  Intervention: Provide Person-Centered Care  Recent Flowsheet Documentation  Taken 10/26/2023 0853 by Anna Cool RN  Trust Relationship/Rapport:   care explained   choices provided   questions answered   questions encouraged     Problem: Postpartum ( Delivery)  Goal: Successful Parent Role Transition  Outcome: Progressing  Goal: Effective Urinary Elimination  Outcome: Progressing   Goal Outcome Evaluation:  VSS and postpartum assessments WDL. Hedrick catheter removed at 1500 due to patient history of urinary retention after surgical procedures. Patient reported extreme discomfort after attempting to void for 30 minutes. Has history of difficulty voiding post surgically for 24-48 hours. Straight cath done for 600 mL. Patient requesting hedrick be re-placed as she's feeling triggered by potential need for more straight caths. Provider paged. Up ad alea with steady gait and independent with cares. Has been in NICU most of the afternoon. Breastfeeding in NICU, lactation to see to initiate pumping.  Pain managed with tylenol, ibuprofen.  Spouse present and supportive.  Will continue with postpartum cares and education per plan of care.

## 2023-10-26 NOTE — ANESTHESIA PROCEDURE NOTES
TAP Procedure Note    Pre-Procedure   Staff -        Anesthesiologist:  Anyi Hinojosa MD       Resident/Fellow: Elias Mcclelland MD       Performed By: resident and with residents       Procedure performed by resident/fellow/CRNA in presence of a teaching physician.         Location: OB       Procedure Start/Stop Times: 10/25/2023 9:35 PM and 10/25/2023 9:41 PM       Pre-Anesthestic Checklist: patient identified, IV checked, site marked, risks and benefits discussed, informed consent, monitors and equipment checked, pre-op evaluation, at physician/surgeon's request and post-op pain management  Timeout:       Correct Patient: Yes        Correct Procedure: Yes        Correct Site: Yes        Correct Position: Yes        Correct Laterality: Yes        Site Marked: Yes  Procedure Documentation  Procedure: TAP       Laterality: bilateral       Patient Position: supine       Skin prep: Chloraprep       Needle Gauge: 21.        Needle Length (millimeters): 110        Ultrasound guided       1. Ultrasound was used to identify targeted nerve, plexus, vascular marker, or fascial plane and place a needle adjacent to it in real-time.       2. Ultrasound was used to visualize the spread of anesthetic in close proximity to the above referenced structure.       3. A permanent image is entered into the patient's record.       4. The visualized anatomic structures appeared normal.       5. There were no apparent abnormal pathologic findings.    Assessment/Narrative         The placement was negative for: blood aspirated, painful injection and site bleeding       Paresthesias: No.       Bolus given via needle. no blood aspirated via catheter.        Secured via.        Insertion/Infusion Method: Single Shot       Complications: none    Medication(s) Administered   Bupivacaine 0.25% PF (Infiltration) - Infiltration   20 mL - 10/25/2023 9:35:00 PM  Bupivacaine liposome (Exparel) 1.3% LA inj susp (Infiltration) -  "Infiltration   20 mL - 10/25/2023 9:35:00 PM  Medication Administration Time: 10/25/2023 9:35 PM     Comments:  Bilateral TAP Block      FOR [facility] ([facility]) ONLY:   Pain Team Contact information: please page the Pain Team Via [employer]. Search \"Pain\". During daytime hours, please page the attending first. At night please page the resident first.      "

## 2023-10-26 NOTE — LACTATION NOTE
This note was copied from a baby's chart.  Lactation Admission Note      Baby Information:  Infant's first name:     Infant medical history: Born by  at 37w5d, RDS     needed? No    Lactation goal (if known): Breastfeed  Lactation history:  former 34w6d daughter for 22months,  full term son for 15months (when she became pregnant with third child). Reports history of mastitis for which she plans to use sunflower lecithin as prophylactic. Ankyloglossia diagnosis with treatment in second child. Reports that she does not let down to a pump, prefers to offer breast and use Haaka, manual pump, or hand express.     Mother's Information: Name: Diane   Occupation:    Age: 34  Delivery type:     [place]: [place]  Pump for home use: Declined pump. Reports that she does not let down to a pump, prefers to offfer breast and use Haaka, manual pump, or hand express. Reviewed options available if desired.     Partner's name: John  Occupation:      Relevant maternal medical and social hx:       Information for the patient's mother:  Diane Smith [[id number]]     Past Medical History:   Diagnosis Date    History of delivery by vacuum extraction, currently pregnant     History of postpartum hemorrhage     Vertebral artery dissection (H24)           Relevant maternal medications:   Zoloft - Hale lactation category L2-limited data-probably compatible. Infant monitoring for sedation or irritability, not waking to feed/poor feeding, and weight gain.     Maternal risk factors:  Anxiety  Diabetes (type) gestational   PTSD related to traumatic births of first and second children      Admission Education given:  [x]Admission packet  [x]Kangaroo care  [x]Benefits of breast milk  [x]How breast milk is made  [x]Stages of milk production  [x]Milk supply/ goal volumes  [x]Hand expression  [x]Hands-on pumping/haaka  [x]Collecting, labeling, transporting milk  [x]Cleaning, sanitizing pump  parts/edison  [x]Storage of milk      KEV Jorgensen, RN, IBCLC   Lactation Consultant  Ascom: *[phone]  Office: [phone]

## 2023-10-26 NOTE — OP NOTE
Obstetrics & Gynecology Service  Operative Note -  Section    Surgery Date:  2023  Case ID: [id number]  Surgeon(s): Leeann Li MD  Assistant(s): Naila Fuentes MD (PGY-2)    Preoperative Diagnosis:  - Intrauterine pregnancy at 37w5d  - Gestational hypertension  - PTSD/JAYDON  - Hx possible vertebral artery dissection  - H/o PPH requiring D&C  - H/o vaccum-assisted   - H/o  x1, STAT for fetal bradycardia     Post-op Diagnosis:         - Same as above, s/p below stated procedures  - Uterine window  - arcuate uterus    Procedure: Repeat low-transverse  section with single layer uterine closure via Pfannenstiel skin incision, removal of right paratubal cyst     Anesthesia:  Spinal  QBL: 560 mL  IVF: 700 mL crystalloid   UOP: 100 mL at end of case  Specimens: 1. Right paratubal cyst  Complications: None      Operative Findings:   - Mild rectofascial adhesions. No intraabdominal adhesions.  - On abdominal entry, lower uterine segment very thin over area of previous hysterotomy with bulging amniotic sac containing clear fluid consistent with uterine window. Otherwise normal appearing arcuate shaped uterus, fallopian tubes, and ovaries. Right fallopian tube with 1 cm paratubal cyst, removed and sent to pathology.  - Single, viable male infant at 2034 hours on 10/25/2023. APGARS of 8 and 8 at one and five minutes.  Birth weight: 2840 g.  Fetal presentation: vertex. Amniotic fluid: clear.     - Placenta removed intact.      Indication: Diane Smith is a 33 year old  who was admitted at 37w5d for new diagnosis of gestational hypertension.  She desired repeat  delivery. The risks, benefits, and alternatives of  delivery were explained, and the patient agreed to proceed.     Procedure details:  After obtaining informed consent, the patient was taken to the operating room. She received 2 g Ancef prior to the skin incision. She was placed in the dorsal  supine position with a leftward tilt and prepped and draped in the usual sterile fashion.     Following test of adequate spinal anesthesia, the abdomen was entered through a transverse skin incision through her previous scar. The skin incision was made sharply and carried through the subcutaneous tissue to the fascia.  Fascia was incised in the midline and extended laterally with the Del Valle scissors.  The superior aspect of the fascia was grasped with the Kocher clamps and dissected off of the underlying rectus muscles with blunt dissection. Attention was then turned to the inferior aspect of the fascia, which was similarly dissected off of the underlying rectus muscles. The rectus muscles were bluntly  in the midline. The peritoneum was entered bluntly, and the opening was extended with digital pressure. The bladder blade was placed. A bladder flap was digitally created. The lower segment of the uterus was opened sharply in a transverse fashion and extended with digital pressure. The infant was noted to be in the cephalic position and was delivered atraumatically. The cord was doubly clamped after 60 seconds and cut, and the infant was handed off to the waiting nursery staff. A segment of the cord was cut and set aside for cord gases if needed. The placenta was delivered with gentle traction on the umbilical cord and uterine massage. The uterus was exteriorized from the abdomen and cleared of all clots and debris.  The uterus was massaged and was noted to be moderately firm.  Pitocin was given through the running IV.  The hysterotomy was repaired with 0-Vicryl suture in a running locked fashion. The posterior cul-de-sac was cleared of all clots and debris. A right paratubal cyst was noted and removed with electrocautery. The uterus was returned to the abdomen. The pericolic gutters were cleared of all clots and debris. The hysterotomy was reexamined and noted to be hemostatic. The bladder flap was inspected  and found to be hemostatic. The fascia and rectus muscles were examined and areas of oozing were controlled with electrocautery. The fascia was closed with a running 0-Vicryl suture. The subcutaneous tissue was irrigated, and areas of oozing were controlled with electrocautery. The skin was closed with 4-0 Monocryl and covered with Dermabond and a sterile dressing.    The patient tolerated the procedure well and was taken to the recovery room in stable condition. All sponge, needle and instrument counts were correct x2. Intraoperative findings of uterine window were discussed with the patient. Would advise against future TOLAC.    Dr. Li was present for the entire procedure.     Naila Fuentes MD  Obstetrics & Gynecology, PGY-2  10/25/2023 10:45 PM     Staff:  I was scrubbed and present for entire case and agree w/ above note.    Leeann Li MD

## 2023-10-26 NOTE — PROGRESS NOTES
Patient arrived to [facility] unit via zoom cart at 0020 ,with belongings, accompanied by spouse/ significant other, with infant in  NICU . Received report from  SHIVAM Reed  and checked bands. Unit and room orientation started. Call light within arms reach; no concerns present at this time. Continue with plan of care.

## 2023-10-26 NOTE — PROGRESS NOTES
10/26/23 1016   Provider Notification   Provider Name/Title Jose ABURTO   Method of Notification Electronic Page   Request Evaluate-Remote   Notification Reason Medication Request     Pt reports that she normally takes 100 mg of sertraline and had been taking baby aspirin in AM during pregnancy. Can we order the zoloft and do you want to restart the aspirin? Thanks! Per Dr. Garcia restarted zoloft order and will consult to decide whether to restart aspirin in setting of previous potential vertebra dissection diagnosis. Per provider okay with keeping catheter in until this afternoon in light of patient's concerns regarding previous bladder retention issues after delivery.

## 2023-10-26 NOTE — ANESTHESIA PROCEDURE NOTES
TAP Procedure Note    Pre-Procedure   Staff -        Anesthesiologist:  Anyi Hinojosa MD       Resident/Fellow: Elias Mcclelland MD       Performed By: resident       Location: pre-op       Procedure Start/Stop Times: 10/25/2023 9:40 AM       Pre-Anesthestic Checklist: patient identified, IV checked, site marked, risks and benefits discussed, informed consent, monitors and equipment checked, pre-op evaluation, at physician/surgeon's request and post-op pain management  Timeout:       Correct Patient: Yes        Correct Procedure: Yes        Correct Site: Yes        Correct Position: Yes        Correct Laterality: Yes        Site Marked: Yes  Procedure Documentation  Procedure: TAP       Diagnosis: POST OPERATIVE PAIN       Laterality: bilateral       Patient Position: supine       Patient Prep/Sterile Barriers: sterile gloves, mask       Skin prep: Chloraprep       Needle Type: short bevel       Needle Gauge: 21.        Needle Length (millimeters): 110        Ultrasound guided       1. Ultrasound was used to identify targeted nerve, plexus, vascular marker, or fascial plane and place a needle adjacent to it in real-time.       2. Ultrasound was used to visualize the spread of anesthetic in close proximity to the above referenced structure.       3. A permanent image is entered into the patient's record.    Assessment/Narrative         The placement was negative for: blood aspirated, painful injection and site bleeding       Paresthesias: No.       Bolus given via needle..        Secured via.        Insertion/Infusion Method: Single Shot       Complications: none       Injection made incrementally with aspirations every 5 mL.    Medication(s) Administered   Bupivacaine 0.25% PF (Infiltration) - Infiltration   20 mL - 10/25/2023 9:40:00 PM  Bupivacaine liposome (Exparel) 1.3% LA inj susp (Infiltration) - Infiltration   20 mL - 10/25/2023 9:40:00 PM    FOR [facility] ([facility] [place]) ONLY:   Pain Team Contact  "information: please page the Pain Team Via [facility]. Search \"Pain\". During daytime hours, please page the attending first. At night please page the resident first.      "

## 2023-10-26 NOTE — CARE PLAN
Data: Diane Smith transferred to [facility] via cart at 0020 after visit to 11th floor NICU to visit infant.  Action: Receiving unit notified of transfer: Yes. Patient and family notified of room change. Report given to April  at 0000. Belongings sent to receiving unit. Accompanied by Registered Nurse. Oriented patient to surroundings. Call light within reach.  Response: Patient tolerated transfer and is stable.

## 2023-10-26 NOTE — ANESTHESIA POSTPROCEDURE EVALUATION
Patient: Diane PENN Olcarolyneogjatin    Procedure: Procedure(s):   section       Anesthesia Type:  Spinal    Note:  Disposition: Admission   Postop Pain Control: Uneventful            Sign Out: Well controlled pain   PONV: No   Neuro/Psych: Uneventful            Sign Out: Acceptable/Baseline neuro status   Airway/Respiratory: Uneventful            Sign Out: Acceptable/Baseline resp. status   CV/Hemodynamics: Uneventful            Sign Out: Acceptable CV status; No obvious hypovolemia; No obvious fluid overload   Other NRE: NONE   DID A NON-ROUTINE EVENT OCCUR? No           Last vitals:  Vitals Value Taken Time   /65 10/25/23 2215   Temp 36.4  C (97.5  F) 10/25/23 2144   Pulse 52 10/25/23 2215   Resp 14 10/25/23 2215   SpO2 95 % 10/25/23 2215       Electronically Signed By: Anyi Rodrigues MD  2023  6:42 AM

## 2023-10-26 NOTE — PLAN OF CARE
Patient transferred to OR at 1958, prepped and hedrick placed. Delivery of infant male at 2034. Infant transferred to NICU care at 10 minutes of life. . See providers notes for details on procedure. TAPS block performed in OR, stable transfer to PACU.       Pt to OB PACU at 2144 via cart. PIV infusing LR to gravity, began second bag of pitocin infusing on pump without complications, hedrick with yellow colored urine to gravity, temp 97.5, vss, pt does not complain of pain and/or nausea.   Interventions: IV to pump, monitors and alarms on, SCD on, support person John at bedside  Plan: Patient instructed to notify RN for pain or nausea, routine post op cares, initiate breastfeeding/pumping as soon as patient/infant able.

## 2023-10-26 NOTE — DISCHARGE SUMMARY
[facility] Discharge Summary    Diane Smith MRN# [id number]   Age: 33 year old YOB: 1990     Date of Admission:  10/25/2023  Date of Discharge:  10/28/23   Admitting Physician:  Leeann Li MD  Discharge Physician:  Naila Jay MD             Admission Diagnoses:    at 37w5d  Vertebral artery dissection  History of Pre-E without SF  gHTN   H/o STAT CS x1, fetal ro  H/o vac-assisted   H/o PPH c/b D&C           Discharge Diagnosis:     Same as above, now delivered           Procedures:     Procedure(s): Repeat low transverse  section with single layer closure via Pfannenstiel skin incision  CTA head/neck  TAP Block                 Medications Prior to Admission:     Medications Prior to Admission   Medication Sig Dispense Refill Last Dose    B Complex-C (VITAMIN B COMPLEX W/VITAMIN C) TABS tablet Take 1 tablet by mouth daily   10/25/2023 at 0800    calcium carbonate (TUMS) 500 MG chewable tablet Take 1 chew tab by mouth 2 times daily   10/24/2023 at 0800    cholecalciferol (VITAMIN D3) 125 mcg (5000 units) capsule Take 125 mcg by mouth daily   10/24/2023 at 2145    lactobacillus rhamnosus, GG, (CULTURELL) capsule Take 1 capsule by mouth 2 times daily   10/25/2023 at 0800    Prenatal Vit-Fe Fumarate-FA (PRENATAL MULTIVITAMIN  PLUS IRON) 27-1 MG TABS Take 1 tablet by mouth daily   10/24/2023 at 2145    sertraline (ZOLOFT) 100 MG tablet Take 100 mg by mouth daily   10/24/2023 at 2145    [DISCONTINUED] aspirin 81 MG EC tablet Take 81 mg by mouth daily   10/24/2023             Discharge Medications:        Review of your medicines        START taking        Dose / Directions   acetaminophen 325 MG tablet  Commonly known as: TYLENOL  Used for: S/P  section      Dose: 650 mg  Take 2 tablets (650 mg) by mouth every 6 hours as needed for mild pain Start after Delivery.  Quantity: 100 tablet  Refills: 0     ibuprofen 600 MG tablet  Commonly known as:  ADVIL/MOTRIN  Used for: S/P  section      Dose: 600 mg  Take 1 tablet (600 mg) by mouth every 6 hours as needed for moderate pain Start after delivery  Quantity: 60 tablet  Refills: 0     NIFEdipine ER 30 MG 24 hr tablet  Commonly known as: ADALAT CC  Used for: S/P  section      Dose: 30 mg  Take 1 tablet (30 mg) by mouth 2 times daily  Quantity: 60 tablet  Refills: 0     prazosin 1 MG capsule  Commonly known as: MINIPRESS  Used for: S/P  section      Dose: 1 mg  Take 1 capsule (1 mg) by mouth at bedtime  Quantity: 30 capsule  Refills: 1     senna-docusate 8.6-50 MG tablet  Commonly known as: SENOKOT-S/PERICOLACE  Used for: S/P  section      Dose: 1 tablet  Take 1 tablet by mouth daily Start after delivery.  Quantity: 100 tablet  Refills: 0            CONTINUE these medicines which have NOT CHANGED        Dose / Directions   calcium carbonate 500 MG chewable tablet  Commonly known as: TUMS      Dose: 1 chew tab  Take 1 chew tab by mouth 2 times daily  Refills: 0     cholecalciferol 125 mcg (5000 units) capsule  Commonly known as: VITAMIN D3      Dose: 125 mcg  Take 125 mcg by mouth daily  Refills: 0     lactobacillus rhamnosus (GG) capsule      Dose: 1 capsule  Take 1 capsule by mouth 2 times daily  Refills: 0     prenatal multivitamin  plus iron 27-1 MG Tabs      Dose: 1 tablet  Take 1 tablet by mouth daily  Refills: 0     sertraline 100 MG tablet  Commonly known as: ZOLOFT      Dose: 100 mg  Take 100 mg by mouth daily  Refills: 0     vitamin b complex w/vitamin C Tabs tablet      Dose: 1 tablet  Take 1 tablet by mouth daily  Refills: 0            STOP taking      aspirin 81 MG EC tablet                  Where to get your medicines        These medications were sent to [facility] [place], [place] - [address]  [address] [address]      Phone: [phone]   acetaminophen 325 MG tablet  ibuprofen 600 MG tablet  NIFEdipine ER 30 MG 24 hr  tablet  prazosin 1 MG capsule  senna-docusate 8.6-50 MG tablet               Consultations:   Anesthesia  Lactation   Neurology   Psychiatry          Brief Admission History:   Diane Smith is a 33 year old  who was admitted at 37w5d for new diagnosis of gestational hypertension.  She desired repeat  delivery. The risks, benefits, and alternatives of  delivery were explained, and the patient agreed to proceed.           Intraoperative course   The procedure was uncomplicated.   mL.  See operative report for details.     Operative Findings:   - Mild rectofascial adhesions. No intraabdominal adhesions.  - On abdominal entry, lower uterine segment very thin over area of previous hysterotomy with bulging amniotic sac containing clear fluid consistent with uterine window. Otherwise normal appearing uterus, fallopian tubes, and ovaries. Right fallopian tube with 1 cm paratubal cyst, removed and sent to pathology.  - Single, viable male infant at 2034 hours on 10/25/2023. APGARS of 8 and 8 at one and five minutes.  Birth weight: 2840 g.  Fetal presentation: vertex. Amniotic fluid: clear.     - Placenta removed intact.       Postpartum Course   The patient's hospital course was notable for mood changes. Psychiatry was consulted and recommended addition of prazosin 1 mg at bedtime and continued outpatient follow up. From a blood pressure standpoint, her blood pressures were well controlled on 30 mg nifedipine XL BID.  She recovered as anticipated and experienced no post-operative complications. On discharge, her pain was well controlled. Vaginal bleeding is similar to peak menstrual flow.  Voiding without difficulty.  Ambulating well and tolerating a normal diet.  No fever or significant wound drainage.  Breastfeeding well.  Infant is stable.  Passing flatus.  She was discharged on post-partum day #3.    Post-partum hemoglobin:   Hemoglobin   Date Value Ref Range Status   10/26/2023 11.0  (L) 11.7 - 15.7 g/dL Final             Discharge Instructions and Follow-Up:     Discharge diet: Regular   Discharge activity: No lifting greater than 20 lbs, pushing, pulling, or other strenuous activity for 6 weeks. Pelvic rest for 6 weeks including no sexual intercourse, tampons, or douching. No driving until you can slam on the brakes without pain or while on narcotic pain medications.    Discharge follow-up: Follow up with primary OB for routine postpartum visit in 6 weeks, 1 week mood check with CNM team.   Wound care: Keep incision clean and dry           Discharge Disposition:     Discharged to home in stable condition      Jerrell Alfaro MD, MPH  Ob/Gyn Resident, PGY-3     I have seen and examined the patient. I have reviewed and agree with the note.   Naila Jay MD

## 2023-10-26 NOTE — PROVIDER NOTIFICATION
10/26/23 0551   Provider Notification   Provider Name/Title [email or web address]   Method of Notification Electronic Page   Request Evaluate-Remote   Notification Reason Status Update     Pt output adequate but currently sleeping, are you okay with planning to take hedrick out once awake and drinking more fluids PO? Want to prevent straight cathing pt if not necessary.

## 2023-10-26 NOTE — ANESTHESIA PROCEDURE NOTES
"Intrathecal injection Procedure Note    Pre-Procedure   Staff -        Anesthesiologist:  Anyi Hinojosa MD       Resident/Fellow: Elias Mcclelland MD       Performed By: resident and with residents       Procedure performed by resident/fellow/CRNA in presence of a teaching physician.         Location: OB       Procedure Start/Stop Times: 10/25/2023 8:11 PM and 10/25/2023 8:14 PM       Pre-Anesthestic Checklist: patient identified, IV checked, risks and benefits discussed, informed consent, monitors and equipment checked, pre-op evaluation, at physician/surgeon's request and post-op pain management  Timeout:       Correct Patient: Yes        Correct Procedure: Yes        Correct Site: Yes        Correct Position: Yes   Procedure Documentation  Procedure: intrathecal injection       Patient Position: sitting       Skin prep: Chloraprep       Insertion Site: L3-4. (midline approach).       Needle Gauge: 25.        Needle Length (Inches): 5        Spinal Needle Type: Pencan       Introducer used       Introducer: 20 G       # of attempts: 1 and  # of redirects:  0    Assessment/Narrative         Paresthesias: No.       Sensory Level: T8       CSF fluid: clear.       Opening pressure was cmH2O while  Sitting.      Medication(s) Administered   0.75% Hyperbaric Bupivacaine (Intrathecal) - Intrathecal   1.8 mL - 10/25/2023 8:11:00 PM  Fentanyl PF (Intrathecal) - Intrathecal   15 mcg - 10/25/2023 8:11:00 PM  Morphine PF 1 mg/mL (Intrathecal) - Intrathecal   0.15 mg - 10/25/2023 8:11:00 PM  Medication Administration Time: 10/25/2023 8:11 PM      FOR [facility] ([facility]) ONLY:   Pain Team Contact information: please page the Pain Team Via Apptive. Search \"Pain\". During daytime hours, please page the attending first. At night please page the resident first.      "

## 2023-10-26 NOTE — PROGRESS NOTES
"[facility]   Post-partum Progress Note    Name:  Diane Smith  MRN: [id number]    S: Patient is a 33 year old  who was admitted at 37w5d for new diagnosis of gestational hypertension. Patient reports doing well following c/s 10/25. Her pain is well controlled. Huerta in place per patient request. She reports having delayed onset of micturition following prior procedures. Tolerating regular diet without nausea or vomiting.  Ambulating without without any issues.  Lochia is within expected limits.  The patient has not passed flatus or BM . Planning on breast feeding/pumping.      O:   Patient Vitals for the past 24 hrs:   BP Temp Temp src Pulse Resp SpO2 Height Weight   10/26/23 0030 116/77 98.2  F (36.8  C) Oral 59 16 100 % -- --   10/25/23 2330 110/70 97.5  F (36.4  C) Oral 62 16 100 % -- --   10/25/23 2315 116/74 -- -- 59 -- 99 % -- --   10/25/23 2300 100/66 -- -- (!) 49 16 95 % -- --   10/25/23 2245 106/67 -- -- 57 15 98 % -- --   10/25/23 2230 112/81 -- -- 51 16 97 % -- --   10/25/23 2215 106/65 -- -- 52 14 95 % -- --   10/25/23 2200 112/67 -- -- 66 14 97 % -- --   10/25/23 2144 115/62 97.5  F (36.4  C) Axillary 62 -- -- -- --   10/25/23 1832 134/85 98.1  F (36.7  C) Oral -- 18 -- -- --   10/25/23 1715 -- -- -- -- -- -- 1.613 m (5' 3.5\") 76.7 kg (169 lb)   10/25/23 1438 137/85 98.8  F (37.1  C) Oral -- 20 -- -- --     Gen:  Resting comfortably, NAD  CV:  RR, well perfused  Pulm:  Non-labored breathing on room air  Abd:  Soft, appropriately ttp, non-distended.  Incision:  Clean, dry, intact. No erythema, drainage or induration   Ext:  Non-tender, trace edema to bilateral lower extremities    I/O last 3 completed shifts:  In: 400 [I.V.:400]  Out: 660 [Urine:100; Blood:560]      Assessment/Plan:  Diane Smith 33 year old  on POD #1 s/p PLTCS. Patient doing well in the immediate post-op period.    # Postpartum management  Pain: Well-controlled with ibuprofen, " tylenol, and oxycodone PRN   GI:  PRN bowel regimen, anti-emetics  : Huerta in place, will remove this afternoon  Hgb: 13.1>> AM Hemoglobin pending. Asymptomatic from blood loss anemia; will discharge with oral iron if <10.   Rh: Positive  Rubella: Immune  Feed: Will breast feed/pump. No issues   BC: Need to discuss  PPx:  Encourage ambulation, IS, SCDs while confined to bed    #Vertebral artery dissection  - s/p Neurology   - CTA head/neck wnl (10/25)  - Continue PTA 81 mg ASA    #gHTN   - Blood pressures well controled overnight -   - HELLP nl, UPC 0.12  - Will start BP medications, PRN to keep pressures <140/90    Dispo: Anticipate discharge home on POD#2-3    Johnnie Tomas, MS3    Patient in NICU with infant throughout the day.  Morning and afternoon MD providers unable to examine patient; however, no concerns expressed by nursing.   Hemoglobin   Date Value Ref Range Status   10/26/2023 11.0 (L) 11.7 - 15.7 g/dL Final   10/25/2023 13.6 11.7 - 15.7 g/dL Final   Continue routine postpartum cares.  Betty Chou MD

## 2023-10-26 NOTE — PROGRESS NOTES
"Social Work Initial Consult     DATA/ASSESSMENT     General Information     Received order for SW to see for NICU psychosocial assessment.  SW met with parents today to assess needs and to offer support.     Parents are Diane (\"Anna\") and John Serjiocaitlin.  They are  and live in [place], [place].  This baby is is their 3rd baby.  Their other children are 21 months (\"Geronimo\") and 5 1/2 years old (Radha).  Grandparents are caring for them while Anna and baby are hospitalized.        Assessment of Support  Family Strength Factors: Parents identify strong support from their parents and extended family.        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  John works at a lumber [employer] and is the .  He has PTO he is able to use now to have time off to spend with his family.     Anna has Blue Plus MA through [facility].  Baby will be added to the MA.       [facility] Ivett Cook at [facility] will be PCP for baby.      Parents have all essential baby supplies to bring baby home.                Coping/Stress      Anna is open in sharing information about her mental health.  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  She and John worked to process these experiences in a healthy way but Anna acknowledges the challenges to do that in the setting of this complicated pregnancy.  Her midwives followed her closely for extra support.  Anna is connected with a birth trauma Facebook page.  She finds accessing this support is helpful and validating.   SW also shared brochure for PPSM.       INTERVENTION     NICU psychosocial completed.     Provided supportive counseling related to baby's need for NICU admission and Anna's compounded feelings of loss that this is the third birth experience where she has been  from her baby.       Accessed the patient aid fund to assist parents with a one-time parking pass.          PLAN     SW will continue " to follow for supportive interventions.      ZANA Ferraro [facility]  Clinical   Maternal Child Health  Phone:  [phone]  Pager:  [phone]

## 2023-10-26 NOTE — ANESTHESIA CARE TRANSFER NOTE
Patient: Diane Smith    Procedure: Procedure(s):   section       Diagnosis:  delivery w/o mention of indication, ras patel [O82]  Diagnosis Additional Information: No value filed.    Anesthesia Type:   Spinal     Note:    Oropharynx: oropharynx clear of all foreign objects and spontaneously breathing  Level of Consciousness: awake      Independent Airway: airway patency satisfactory and stable  Dentition: dentition unchanged  Vital Signs Stable: post-procedure vital signs reviewed and stable  Report to RN Given: handoff report given  Patient transferred to: Labor and Delivery    Handoff Report: Identifed the Patient, Identified the Reponsible Provider, Reviewed the pertinent medical history, Discussed the surgical course, Reviewed Intra-OP anesthesia mangement and issues during anesthesia, Set expectations for post-procedure period and Allowed opportunity for questions and acknowledgement of understanding      Vitals:  Vitals Value Taken Time   /62 10/25/23 2144   Temp     Pulse 70 10/25/23 2149   Resp     SpO2 96 % 10/25/23 2149   Vitals shown include unfiled device data.    Electronically Signed By: Elias Mcclelland MD  2023  9:50 PM

## 2023-10-26 NOTE — PLAN OF CARE
"  Problem: Adult Inpatient Plan of Care  Goal: Patient-Specific Goal (Individualized)  Description: You can add care plan individualizations to a care plan. Examples of Individualization might be:  \"Parent requests to be called daily at 9am for status\", \"I have a hard time hearing out of my right ear\", or \"Do not touch me to wake me up as it startles  me\".  Outcome: Progressing  Flowsheets (Taken 10/26/2023 0503)  Patient/Family-Specific Goals (Include Timeframe): pt goal during shift to drink adequate amount of fluids to increase urine output in hedrick.   Goal Outcome Evaluation:      Plan of Care Reviewed With: patient    Overall Patient Progress: improvingOverall Patient Progress: improving     Data: VSS, postpartum assessments WNL. Hedrick in place-provider notified. See note. Pt up ad alea, eating and drinking without nausea. Incision appears to be healing well, lochia WNL, no s/s of infection. Pt plans to start pumping for infant in morning. Taking tylenol and toradol for incisional pain and Pt reports good pain management.  Action: Education on plan of care, pain management, pumping and hedrick removal/urinary retention.   Response: Pt is agreeable with her plan of care. Pt visited infant in NICU. Support person, John, present. Will continue with plan of care and notify provider of any changes in status.         "

## 2023-10-26 NOTE — PLAN OF CARE
VSS. Pt denies headache, RUQ or epigastric pain, or visual disturbances. Reflexes are WDL. EFM shows FHR baseline of 130, moderate variability, accels, and no decels. Pt was prepared for a  that is scheduled for . Consent forms were signed. Results of labs are in. SCDs are on.IV is currently running LR.  Tylenol was given. , John, is at bedside. Report given to Brianna WEI RN. Continue with plan of care.                   [Joint Pain] : joint pain [Joint Stiffness] : joint stiffness [Negative] : Heme/Lymph

## 2023-10-27 PROCEDURE — 99232 SBSQ HOSP IP/OBS MODERATE 35: CPT | Mod: GC | Performed by: OBSTETRICS & GYNECOLOGY

## 2023-10-27 PROCEDURE — 250N000013 HC RX MED GY IP 250 OP 250 PS 637

## 2023-10-27 PROCEDURE — 3E0T3BZ INTRODUCTION OF ANESTHETIC AGENT INTO PERIPHERAL NERVES AND PLEXI, PERCUTANEOUS APPROACH: ICD-10-PCS | Performed by: OBSTETRICS & GYNECOLOGY

## 2023-10-27 PROCEDURE — 120N000002 HC R&B MED SURG/OB UMMC

## 2023-10-27 PROCEDURE — 3E0R3NZ INTRODUCTION OF ANALGESICS, HYPNOTICS, SEDATIVES INTO SPINAL CANAL, PERCUTANEOUS APPROACH: ICD-10-PCS | Performed by: OBSTETRICS & GYNECOLOGY

## 2023-10-27 PROCEDURE — 250N000011 HC RX IP 250 OP 636: Mod: JZ

## 2023-10-27 RX ORDER — NIFEDIPINE 30 MG/1
30 TABLET, EXTENDED RELEASE ORAL ONCE
Status: COMPLETED | OUTPATIENT
Start: 2023-10-27 | End: 2023-10-27

## 2023-10-27 RX ORDER — HYDROXYZINE HYDROCHLORIDE 25 MG/1
25 TABLET, FILM COATED ORAL EVERY 6 HOURS PRN
Status: DISCONTINUED | OUTPATIENT
Start: 2023-10-27 | End: 2023-10-28 | Stop reason: HOSPADM

## 2023-10-27 RX ORDER — HYDROXYZINE HYDROCHLORIDE 25 MG/1
50 TABLET, FILM COATED ORAL EVERY 6 HOURS PRN
Status: DISCONTINUED | OUTPATIENT
Start: 2023-10-27 | End: 2023-10-28 | Stop reason: HOSPADM

## 2023-10-27 RX ORDER — NIFEDIPINE 30 MG/1
30 TABLET, EXTENDED RELEASE ORAL DAILY
Status: DISCONTINUED | OUTPATIENT
Start: 2023-10-27 | End: 2023-10-28 | Stop reason: HOSPADM

## 2023-10-27 RX ADMIN — IBUPROFEN 800 MG: 800 TABLET ORAL at 19:04

## 2023-10-27 RX ADMIN — METOCLOPRAMIDE HYDROCHLORIDE 10 MG: 5 INJECTION INTRAMUSCULAR; INTRAVENOUS at 05:01

## 2023-10-27 RX ADMIN — ACETAMINOPHEN 975 MG: 325 TABLET, FILM COATED ORAL at 23:06

## 2023-10-27 RX ADMIN — ACETAMINOPHEN 975 MG: 325 TABLET, FILM COATED ORAL at 17:19

## 2023-10-27 RX ADMIN — ACETAMINOPHEN 975 MG: 325 TABLET, FILM COATED ORAL at 04:56

## 2023-10-27 RX ADMIN — NIFEDIPINE 30 MG: 30 TABLET, FILM COATED, EXTENDED RELEASE ORAL at 21:15

## 2023-10-27 RX ADMIN — SENNOSIDES AND DOCUSATE SODIUM 2 TABLET: 50; 8.6 TABLET ORAL at 20:22

## 2023-10-27 RX ADMIN — NIFEDIPINE 30 MG: 30 TABLET, FILM COATED, EXTENDED RELEASE ORAL at 19:06

## 2023-10-27 RX ADMIN — IBUPROFEN 800 MG: 800 TABLET ORAL at 13:12

## 2023-10-27 RX ADMIN — HYDROXYZINE HYDROCHLORIDE 50 MG: 25 TABLET, FILM COATED ORAL at 19:04

## 2023-10-27 RX ADMIN — IBUPROFEN 800 MG: 800 TABLET ORAL at 07:27

## 2023-10-27 RX ADMIN — SENNOSIDES AND DOCUSATE SODIUM 2 TABLET: 50; 8.6 TABLET ORAL at 08:31

## 2023-10-27 RX ADMIN — SERTRALINE HYDROCHLORIDE 100 MG: 100 TABLET ORAL at 08:31

## 2023-10-27 RX ADMIN — IBUPROFEN 800 MG: 800 TABLET ORAL at 00:25

## 2023-10-27 RX ADMIN — ACETAMINOPHEN 975 MG: 325 TABLET, FILM COATED ORAL at 11:06

## 2023-10-27 ASSESSMENT — ACTIVITIES OF DAILY LIVING (ADL)
ADLS_ACUITY_SCORE: 24

## 2023-10-27 NOTE — PROGRESS NOTES
"Post  Anesthesia Follow Up Note    Patient: Diane Smith    Patient location: Postpartum floor.    Chief complaint: Acute postoperative pain management s/p spinal analgesia.    Procedure(s) Performed:  Procedure(s):   section    Anesthesia type: Spinal Block    Subjective:     Pain Control: Adequate    Additional ROS:  She does not complain of pruritis at this time. She denies weakness, denies paresthesia, denies difficulties breathing or voiding, denies nausea or vomiting. She is able to ambulate and tolerates regular diet.    Objective:    Respiratory Function (RR / SpO2 / Airway Patency): Satisfactory    Cardiac Function (HR / Rhythm / BP): Satisfactory    Strength and sensation lower extremities: Normal    Site of spinal/epidural insertion: No signs of infection or inflammation.     Last Vitals: /88 (BP Location: Left arm, Patient Position: Semi-Leonard's, Cuff Size: Adult Regular)   Pulse 64   Temp 37.3  C (99.1  F) (Oral)   Resp 18   Ht 1.613 m (5' 3.5\")   Wt 76.7 kg (169 lb)   LMP 2023   SpO2 97%   Breastfeeding Unknown   BMI 29.47 kg/m      Assessment and plan:   Diane Smith is a 33 year old female  POD #1 s/p   with spinal and single shot TAP nerve block injections. Pt is ambulating without difficulty, no weakness or paresthesias.  There is no evidence of adverse side effects associated with epidural and nerve block injections.  The patient is receiving adequate incisional pain control at this time and anticipate up to 72 hours of incisional pain control.  However, we anticipate that the patient will require opioid/nonopioid analgesics for visceral and muscle pain that is not controlled with local anesthetic.      In brief summary, her post-operative analgesia is adequate today. Further interventional analgesic strategies would be of little utility at this time. Thus, we recommend proceeding with PO analgesics.    Thank you for including us " in the care of this patient.    Sree Altman MD  Anesthesiology PGY2

## 2023-10-27 NOTE — PROGRESS NOTES
10/26/23 1926   Provider Notification   Provider Name/Title G2   Method of Notification Electronic Page   Request Evaluate-Remote   Notification Reason Medication Request     patient having increased pain, can you place orders for oxycodone and lidocaine patches? also pt requesting hedrick be re-placed    Provider response: will put in orders for oxy and lidocaine patch. OK to put hedrick in until AM (to stay in for approximately 12hrs).

## 2023-10-27 NOTE — LACTATION NOTE
This note was copied from a baby's chart.  Consult for: Third baby, early term infant back with parents after 12 hour stay in NICU. (See also note from NICU LC yesterday): Ankyloglossia diagnosis with treatment in second child. Reports that she does not let down to a pump, prefers to offer breast and use [place], manual pump, or hand express.      Infant Name: Maame    Infant's Primary Care Clinic: [facility]    Delivery Information:  Maame was born at 37w5d via   delivery on 10/25/2023, 8:34 PM     Maternal Health History:    Information for the patient's mother:  Diane Smith [[id number]]     Past Medical History:   Diagnosis Date    History of delivery by vacuum extraction, currently pregnant     History of postpartum hemorrhage     Vertebral artery dissection (H24)     ,   Information for the patient's mother:  Diane Smith [[id number]]     Patient Active Problem List   Diagnosis    Encounter for triage in pregnant patient    Gestational hypertension    , and   Information for the patient's mother:  Diane Smith [[id number]]     Medications Prior to Admission   Medication Sig Dispense Refill Last Dose    aspirin 81 MG EC tablet Take 81 mg by mouth daily   10/24/2023    B Complex-C (VITAMIN B COMPLEX W/VITAMIN C) TABS tablet Take 1 tablet by mouth daily   10/25/2023 at 0800    calcium carbonate (TUMS) 500 MG chewable tablet Take 1 chew tab by mouth 2 times daily   10/24/2023 at 0800    cholecalciferol (VITAMIN D3) 125 mcg (5000 units) capsule Take 125 mcg by mouth daily   10/24/2023 at 2145    lactobacillus rhamnosus, GG, (CULTURELL) capsule Take 1 capsule by mouth 2 times daily   10/25/2023 at 0800    Prenatal Vit-Fe Fumarate-FA (PRENATAL MULTIVITAMIN  PLUS IRON) 27-1 MG TABS Take 1 tablet by mouth daily   10/24/2023 at 214    sertraline (ZOLOFT) 100 MG tablet Take 100 mg by mouth daily   10/24/2023 at 2145        Maternal Breast Exam/Breastfeeding History:  Breasts are soft and  "symmetric with bilateral intact, everted nipples. She has been able to hand express colostrum. ?   Diane  her former 34w6d daughter for 22months, then full term son for 15months (stopped when she became pregnant with third child). Reports history of mastitis for which she plans to use sunflower lecithin as prophylactic.     Oral exam of baby:  Maame has a mildly recessed jaw, normal arch to palate, fair length of tongue beyond lingual frenulum attachment. He frequently drops tongue behind lower gum ridge when sucking on finger quite chompy, though suck became more organized when donor milk given via finger feed. Feel more lift/mashing of tongue than peristaltic waves but he was able to pull down milk from the syringe on his own without any traction on syringe.     Infant information: Maame has age appropriate output and weight loss.      Weight Change Since Birth: -4% at 2 day old     Feeding History: mom shares she's needed hands on assist from her nurses to get a deep latch, though this feeding they did great independently with minimal, mainly verbal, coaching to relax her hand behind Maame's head once he latched.     Feeding Assessment: At breast he was able to get good seal with a deep latch and maintain throughout feeding, left nipple rounded and right nipple creased at tip when he came off. Mom denies pain with feeding (though at the end, half way through SNS supplement they stopped abruptly due to pain with sudden \"biting\" so we finished the last 5 mL via finger feed).      Education:   - Potential feeding challenges with early term infants  - Expected  feeding patterns in the first few days  - Stages of milk production  - Benefits of hand expression of colostrum  - Early feeding cues     - Feeding frequency- encourage at least every 3 hours.   - Breastfeeding positions  - Tips to get and maintain a deep latch independently  - Gentle breast compressions as needed to enhance milk transfer  - How " to tell when baby is finished  - How to tell if baby is getting enough  - Expected  output  -  weight loss  - Infant Feeding Log  - Signs breastfeeding is going well (comfortable latch, audible swallows, age appropriate output and weight loss)    - Hand expression and pumping recommendations (currently expressing with each supplement)  - Supplement recommendations, continuing as in provider notes with supplemental donor milk though slowing volumes to his cues now that he is with mom and breastfeeding well.    - Satiety cues   - Inpatient breastfeeding support    Handouts: Infant Feeding Log (Week 1, Your Guide to Postpartum & [place] Care Book)     Plan (Early Term): Frequent skin to skin, watch for early feeding cues and breastfeed on cue with goal of 8 to 12 feedings in 24 hours. Go no longer than 3 hours between feedings. Encourage breast compressions to enhance milk transfer when infant at the breast.    Encourage hand expression after feedings until milk is in, and hands on pumping any time Euan gets supplement but at least 3-4 times daily to support strong milk supply. Feed back any milk expressed.       Anya Leigh RN, IBCLC   Lactation Consultant  Ascom: *[phone]  Office: [phone]

## 2023-10-27 NOTE — PROGRESS NOTES
[facility]   Post-partum Progress Note    Name:  Diane Smith  MRN: [id number]    S: Patient feeling ok this AM.  Is a bit frustrated that she is having postoperative urinary retention, which she had after her prior  delivery.  Her Huerta came out yesterday shortly after surgery.  She was then straight catheterized once and not able to void successfully after this.  Patient requested a Huerta, which was put in place overnight.  Since then, the patient has been feeling better.  Overall, her pain has been well controlled.  She is passing gas, tolerating p.o. intake, ambulating without dizziness.  She is breast-feeding and pumping for baby.  She denies symptoms of headache, vision changes, chest pain, shortness of breath.    O:   Patient Vitals for the past 24 hrs:   BP Temp Temp src Pulse Resp SpO2   10/26/23 2323 122/75 98.4  F (36.9  C) Oral 73 16 --   10/26/23 1839 (!) 142/97 98.1  F (36.7  C) Oral 56 16 --   10/26/23 1249 107/77 97.7  F (36.5  C) Oral 60 16 97 %   10/26/23 0854 119/79 98.1  F (36.7  C) Oral 79 16 97 %   10/26/23 0520 111/67 98  F (36.7  C) Oral 56 16 100 %     Gen:  Resting comfortably, NAD  CV:  RR, well perfused  Pulm:  Non-labored breathing on room air  Abd:  Soft, appropriately ttp, non-distended.  Incision:  Clean, dry, intact. No erythema, drainage or induration   Ext:  Non-tender, trace edema to bilateral lower extremities    I/O last 3 completed shifts:  In: 1100 [P.O.:1100]  Out: 2400 [Urine:2400]    Assessment/Plan:  Diane Smith 33 year old  on POD #2 s/p RLTCS. Patient doing well in the post-op period.    # Postpartum management  Pain: Well-controlled with ibuprofen, tylenol, and oxycodone PRN   GI:  PRN bowel regimen, anti-emetics  : Huerta in place due to POUR and per patient request. UOP adequate. Will remove this evening after discussion with Diane about risks/benefits of 12 vs 24h of bladder rest.  Hgb: 13.1>>11.  Asymptomatic from blood loss anemia.   Rh: Positive  Rubella: Immune  Feed: Breast feed/pumping. No issues   BC: Need to discuss  PPx:  Encourage ambulation, IS, SCDs while confined to bed    # Vertebral artery dissection  No acute concerns or symptoms.  - s/p Neurology   - CTA head/neck wnl (10/25)    # gHTN   - Blood pressures well controled overnight   - HELLP nl, UPC 0.12  - Will start BP medications PRN to keep pressures <140/90  - Asymptomatic this AM    Dispo: Anticipate discharge home on POD#3 pending voiding    Naila Fuentes MD  Obstetrics & Gynecology, PGY-2  10/27/2023 8:29 AM     OBGYN Attending Addendum     I, Temitope Sosa, personally examined and evaluated Diane Smith on 10/27/23. I discussed the patient with the resident, Dr. Fuentes, and care team, and agree with the assessment and plan of care as documented in the resident's note of 10/27/23.    I personally reviewed vitals, meds, labs. Huerta in place given POUR.     Key findings:  at POD#2 with Huerta in place given urinary retention. Will take out this evening after discussion with Diane.     I agree with the plan for discharge tomorrow, pending improvement in urinary retention.    Temitope Sosa MD, MSCI  Date of Service: 10/27/2023

## 2023-10-27 NOTE — PLAN OF CARE
Goal Outcome Evaluation - : VSS - had elevated pressures during the day but was stable this shift. Pain is treated with IBU, Tylenol, and Oxy PRN. Pt has binder in room. Lidocaine patch in place. Pt is ambulating but not yet voiding spontaneously. Pt requested for hedrick d/t previously not being able to void for a day and a half - see previous notes for current bladder care plan. Pt tolerated hedrick cath placement and was visibly swollen making it a little difficult to push the cath in. Pt given ice pack to help decrease perineal swelling. Pt tolerates regular diet without n/v. Pt was visiting NICU x1 until baby arrived to PP unit.     Continue with POC.       Plan of Care Reviewed With: patient    Overall Patient Progress: improvingOverall Patient Progress: improving       Problem: Postpartum ( Delivery)  Goal: Successful Parent Role Transition  Outcome: Progressing  Goal: Hemostasis  Outcome: Progressing  Goal: Effective Bowel Elimination  Outcome: Progressing  Goal: Fluid and Electrolyte Balance  Outcome: Progressing  Goal: Absence of Infection Signs and Symptoms  Outcome: Progressing  Goal: Anesthesia/Sedation Recovery  Outcome: Progressing  Goal: Optimal Pain Control and Function  Outcome: Progressing  Intervention: Prevent or Manage Pain  Recent Flowsheet Documentation  Taken 10/26/2023 2323 by Abdelrahman Cardoso, RN  Pain Management Interventions: medication (see MAR)  Goal: Nausea and Vomiting Relief  Outcome: Progressing  Goal: Effective Urinary Elimination  Outcome: Progressing  Goal: Effective Oxygenation and Ventilation  Outcome: Progressing  Intervention: Optimize Oxygenation and Ventilation  Recent Flowsheet Documentation  Taken 10/26/2023 2345 by Abdelrahman Cardoso, RN  Head of Bed (HOB) Positioning: HOB at 60 degrees

## 2023-10-27 NOTE — PLAN OF CARE
Vital signs except two slight diastolic blood pressure of 95 in the afternoon and has been high -150//90. Pt denies any headaches, vision changes or epigastric pain. Postpartum assessments within normal limits. Pt is eating and drinking. Had a bowel movement and have large amount of clear urine in the hedrick. Pt is breastfeeding well with assist latching baby deeper. Nipples are tender, but tolerable with breastfeeding. Pt is also giving baby hand express colostrum and donor breast milk using SNS/finger feeding. Pt is anxious and worried about her past experience with the hedrick in place. Medicated pt with Ibuprofen and Tylenol for pain control. Notified MD regarding the blood pressure, but never got a call back in the afternoon. The doctor ordered 30 mg of Procardia and  Atarax after giving her a call regarding the blood pressure again. Assisted pt with deeper latching, hand expression and finger feeding. Encouraged to hand expressed colostrum and apply to nipples after breastfeeding. Encouraged pt to rest when baby is resting. The hedrick was discontinues at 1910. Continue to monitor.

## 2023-10-27 NOTE — PROVIDER NOTIFICATION
10/27/23 0558   Provider Notification   Provider Name/Title G2   Method of Notification Electronic Page   Request Evaluate-Remote   Notification Reason Vital Signs Change     FYI /87 .

## 2023-10-28 VITALS
BODY MASS INDEX: 27.78 KG/M2 | WEIGHT: 162.7 LBS | DIASTOLIC BLOOD PRESSURE: 80 MMHG | OXYGEN SATURATION: 97 % | RESPIRATION RATE: 16 BRPM | SYSTOLIC BLOOD PRESSURE: 120 MMHG | HEIGHT: 64 IN | HEART RATE: 79 BPM | TEMPERATURE: 98.9 F

## 2023-10-28 PROCEDURE — 99238 HOSP IP/OBS DSCHRG MGMT 30/<: CPT | Mod: GC | Performed by: STUDENT IN AN ORGANIZED HEALTH CARE EDUCATION/TRAINING PROGRAM

## 2023-10-28 PROCEDURE — 250N000013 HC RX MED GY IP 250 OP 250 PS 637

## 2023-10-28 PROCEDURE — 99254 IP/OBS CNSLTJ NEW/EST MOD 60: CPT

## 2023-10-28 RX ORDER — IBUPROFEN 600 MG/1
600 TABLET, FILM COATED ORAL EVERY 6 HOURS PRN
Qty: 60 TABLET | Refills: 0 | Status: SHIPPED | OUTPATIENT
Start: 2023-10-28

## 2023-10-28 RX ORDER — AMOXICILLIN 250 MG
1 CAPSULE ORAL DAILY
Qty: 100 TABLET | Refills: 0 | Status: SHIPPED | OUTPATIENT
Start: 2023-10-28

## 2023-10-28 RX ORDER — ACETAMINOPHEN 325 MG/1
650 TABLET ORAL EVERY 6 HOURS PRN
Qty: 100 TABLET | Refills: 0 | Status: SHIPPED | OUTPATIENT
Start: 2023-10-28

## 2023-10-28 RX ORDER — NIFEDIPINE 30 MG
30 TABLET, EXTENDED RELEASE ORAL 2 TIMES DAILY
Qty: 60 TABLET | Refills: 0 | Status: SHIPPED | OUTPATIENT
Start: 2023-10-28

## 2023-10-28 RX ORDER — PRAZOSIN HYDROCHLORIDE 1 MG/1
1 CAPSULE ORAL AT BEDTIME
Qty: 30 CAPSULE | Refills: 1 | Status: SHIPPED | OUTPATIENT
Start: 2023-10-28

## 2023-10-28 RX ADMIN — SERTRALINE HYDROCHLORIDE 100 MG: 100 TABLET ORAL at 08:21

## 2023-10-28 RX ADMIN — IBUPROFEN 800 MG: 800 TABLET ORAL at 01:03

## 2023-10-28 RX ADMIN — ACETAMINOPHEN 975 MG: 325 TABLET, FILM COATED ORAL at 11:42

## 2023-10-28 RX ADMIN — LIDOCAINE 2 PATCH: 4 PATCH TOPICAL at 01:03

## 2023-10-28 RX ADMIN — HYDROXYZINE HYDROCHLORIDE 50 MG: 25 TABLET, FILM COATED ORAL at 07:08

## 2023-10-28 RX ADMIN — NIFEDIPINE 30 MG: 30 TABLET, FILM COATED, EXTENDED RELEASE ORAL at 08:21

## 2023-10-28 RX ADMIN — ACETAMINOPHEN 975 MG: 325 TABLET, FILM COATED ORAL at 05:23

## 2023-10-28 RX ADMIN — IBUPROFEN 800 MG: 800 TABLET ORAL at 13:19

## 2023-10-28 RX ADMIN — IBUPROFEN 800 MG: 800 TABLET ORAL at 07:08

## 2023-10-28 RX ADMIN — ACETAMINOPHEN 975 MG: 325 TABLET, FILM COATED ORAL at 17:50

## 2023-10-28 RX ADMIN — SENNOSIDES AND DOCUSATE SODIUM 1 TABLET: 50; 8.6 TABLET ORAL at 08:21

## 2023-10-28 ASSESSMENT — ACTIVITIES OF DAILY LIVING (ADL)
ADLS_ACUITY_SCORE: 24

## 2023-10-28 NOTE — PLAN OF CARE
"Data: VSS, pt denies headache, visual changes, epigastric pain. Pt. up ad alea, eating and drinking without difficulty. Incision CDI. Pt is breastfeeding independently. Nipples are tender, but tolerable with breastfeeding. Pt is supplementing with expressed maternal milk with some feeds. Pain managed with Ibuprofen,Tylenol & lidocaine patches x2. Pt reports feeling anxious and  \"panicky\" atarax given x1 this shift.   Action: Education provided on discharge goals, plan of care, pain management  Response: Pt is agreeable with her plan of care. Support person,   John, present. Anticipate discharge per care plan. Will continue with plan of care and notify provider of any changes in status.  "
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
## 2023-10-28 NOTE — PLAN OF CARE
Pt is stable. Vital sign and postpartum checks within normal limits. Psych consult was ordered and one was able to see pt this morning. Pt is breastfeeding a lot better and independent latching baby. Nipples are slightly tender, but not that bad per mom. Pain is well managed with Ibuprofen and Tylenol. Reviewed teaching with pt. Pt is discharged, but the discharge instructions needs to be reviewed. Checked latch and  flange. Encouraged pt to shower and rest when baby is resting. Continue cares.

## 2023-10-28 NOTE — PROGRESS NOTES
Post Partum Progress Note    Subjective:  She is resting comfortably in bed this morning. Pain is improving and well controlled on current medication regimen. She is tolerating PO intake. Lochia present and appropriate.  She is voiding without difficulty. She has passed flatus. She is ambulating without dizziness or difficulty.  She denies headache, changes in vision, nausea/vomiting, chest pain, shortness of breath, RUQ pain, or worsening edema. Breast feeding.    She is still feeling very triggered from delivery, even though everything went safely and smoothly.  She notes that she has been having some panic attacks and feeling anxious, improved with Vistaril.  She notes she is well supported by her midwife team.  She is interested in talking with the psychiatry team today.    Objective:  Vitals:    10/28/23 0307 10/28/23 0658 10/28/23 0811 10/28/23 0823   BP: 138/84 133/89 129/82 128/82   BP Location: Left arm Left arm Right arm Left arm   Patient Position: Semi-Leonard's Semi-Leonard's  Semi-Leonard's   Cuff Size: Adult Regular Adult Regular  Adult Regular   Pulse:  105 101 102   Resp: 16 18 20 16   Temp: 98.5  F (36.9  C) 98.5  F (36.9  C) 98.4  F (36.9  C) 99.4  F (37.4  C)   TempSrc: Oral Oral Oral Oral   SpO2:       Weight:   73.8 kg (162 lb 11.2 oz)    Height:         General: NAD, resting comfortably  CV: warm, well perfused  Pulm: normal respiratory effort  Abd: soft, non-tender, non-distended  Incision: clean, dry, intact    I/O last 3 completed shifts:  In: 940 [P.O.:940]  Out: 2700 [Urine:2700]    Assessment/Plan:  Diane Smith is a 33 year old  female who is POD#3 s/p RLTCS. Meeting all goals and appropriate for discharge.    # JAYDON  # PTSD  Continue PRN Vistaril. Will consult Psych today given patient has been having panic attacks and significant trauma related to her previous traumatic birth experiences. She has been feeling very triggered by being in the hospital.  - Psych consult  ordered  - Patient has 1 week mood check scheduled with her CNM team    # Vertebral artery dissection  No acute concerns or symptoms.  - s/p Neurology   - CTA head/neck wnl (10/25)     # gHTN   - Blood pressures normotensive overnight. D#2 nifed 60 qHS. Will continue to titrate BP medications PRN to keep pressures <140/90.  - HELLP nl, UPC 0.12  - Asymptomatic this AM    # Postoperative urinary retention, resolved  UOP adequate. Huerta replaced early yesterday morning per patient request after straight catheterization x1 and continued difficulty with voiding. Huerta removed last night. Patient has voided several times without difficulty since removal.    # Postpartum care  - PNC: Rh positive. Rubella immune. No intervention indicated.  - Pain: well controlled on current regimen  - Heme: Hgb 13.6> > 11.0  - GI: continue anti-emetics and stool softeners as needed.  - : Spontaneously voiding  - Feeding: Breast  - BC: follow-up at postpartum clinic visit  - PPx: encourage ambulation, IS, SCDs while in bed for prolonged periods. Lovenox not indicated.    Dispo: Anticipate discharge to home on POD#3    Naila Fuentes MD  Obstetrics & Gynecology, PGY-2  10/28/2023 7:28 AM     I have seen and examined the patient separately the resident. I have reviewed and agree with the note. Now s/p Psychiatry consult who recommended adding Prazosin 1 mg at bedtime and discontinuing Vistaril.   Naila Jay MD

## 2023-10-29 ENCOUNTER — TELEPHONE (OUTPATIENT)
Dept: OBGYN | Facility: CLINIC | Age: 33
End: 2023-10-29
Payer: COMMERCIAL

## 2023-10-29 NOTE — DISCHARGE INSTRUCTIONS
Postop  Birth Instructions    Activity     Do not lift more than 10 pounds for 6 weeks after surgery.  Ask family and friends for help when you need it.  No driving until you have stopped taking your pain medications (usually two weeks after surgery).  No heavy exercise or activity for 6 weeks.  Don't do anything that will put a strain on your surgery site.  Don't strain when using the toilet.  Your care team may prescribe a stool softener if you have problems with your bowel movements.     To care for your incision:     Keep the incision clean and dry.  Do not soak your incision in water. No swimming or hot tubs until it has fully healed. You may soak in the bathtub if the water level is below your incision.  Do not use peroxide, gel, cream, lotion, or ointment on your incision.  Adjust your clothes to avoid pressure on your surgery site (check the elastic in your underwear for example).     You may see a small amount of clear or pink drainage and this is normal.  Check with your health care provider:     If the drainage increases or has an odor.  If the incision reddens, you have swelling, or develop a rash.  If you have increased pain and the medicine we prescribed doesn't help.  If you have a fever above 100.4 F (38 C) with or without chills when placing thermometer under your tongue.   The area around your incision (surgery wound), will feel numb.  This is normal. The numbness should go away in less than a year.     Keep your hands clean:  Always wash your hands before touching your incision (surgery wound). This helps reduce your risk of infection. If your hands aren't dirty, you may use an alcohol hand-rub to clean your hands. Keep your nails clean and short.    Call your healthcare provider if you have any of these symptoms:     You soak a sanitary pad with blood within 1 hour, or you see blood clots larger than a golf ball.  Bleeding that lasts more than 6 weeks.  Vaginal discharge that smells  bad.  Severe pain, cramping or tenderness in your lower belly area.  A need to urinate more frequently (use the toilet more often), more urgently (use the toilet very quickly), or it burns when you urinate.  Nausea and vomiting.  Redness, swelling or pain around a vein in your leg.  Problems breastfeeding or a red or painful area on your breast.  Chest pain and cough or are gasping for air.  Problems with coping with sadness, anxiety or depression. If you have concerns about hurting yourself or the baby, call your provider immediately.    You have questions or concerns after you return home.              Warning Signs after Having a Baby    Keep this paper on your fridge or somewhere else where you can see it.    Call your provider if you have any of these symptoms up to 12 weeks after having your baby.    Thoughts of hurting yourself or your baby  Pain in your chest or trouble breathing  Severe headache not helped by pain medicine  Eyesight concerns (blurry vision, seeing spots or flashes of light, other changes to eyesight)  Fainting, shaking or other signs of a seizure    Call 9-1-1 if you feel that it is an emergency.     The symptoms below can happen to anyone after giving birth. They can be very serious. Call your provider if you have any of these warning signs.    My provider s phone number: _______________________    Losing too much blood (hemorrhage)    Call your provider if you soak through a pad in less than an hour or pass blood clots bigger than a golf ball. These may be signs that you are bleeding too much.    Blood clots in the legs or lungs    After you give birth, your body naturally clots its blood to help prevent blood loss. Sometimes this increased clotting can happen in other areas of the body, like the legs or lungs. This can block your blood flow and be very dangerous.     Call your provider if you:  Have a red, swollen spot on the back of your leg that is warm or painful when you touch it.    Are coughing up blood.     Infection    Call your provider if you have any of these symptoms:  Fever of 100.4 F (38 C) or higher.  Pain or redness around your stitches if you had an incision.   Any yellow, white, or green fluid coming from places where you had stitches or surgery.    Mood Problems (postpartum depression)    Many people feel sad or have mood changes after having a baby. But for some people, these mood swings are worse.     Call your provider right away if you feel so anxious or nervous that you can't care for yourself or your baby.    Preeclampsia (high blood pressure)    Even if you didn't have high blood pressure when you were pregnant, you are at risk for the high blood pressure disease called preeclampsia. This risk can last up to 12 weeks after giving birth.     Call your provider if you have:   Pain on your right side under your rib cage  Sudden swelling in the hands and face    Remember: You know your body. If something doesn't feel right, get medical help.     For informational purposes only. Not to replace the advice of your health care provider. Copyright 2020 [facility]. All rights reserved. Clinically reviewed by Nadege Ochoa, RNC-OB, MSN. [place] [id number] - Rev 02/23.

## 2023-10-29 NOTE — PROGRESS NOTES
Reviewed discharge medications, medications given to pt.  Reviewed follow-up for appointment 3-5 days for BP check and 1 weeks post-delivery check.  Reviewed discharge instructions and answered all questions.  Discharged home with infant and all belongings at [facility].

## 2023-10-29 NOTE — LACTATION NOTE
This note was copied from a baby's chart.  Follow up visit with family prior to discharge. Diane shares feedings have gone well though 50% of the time she really has to work with Maame to get him to latch. He starts out biting, but once she gets him on deep it is not painful and feels like organized suck to her the rest of the feeding. His weight loss is minimal, 4.4% 48 hours after  delivery and diaper output meet or exceeds goal for age.     Family is tracking on feeding log, mom continues hand expressing or using haakaa often after feeds PRN feeding back to Euan. Reviewed how to tell if transferring well and if getting enough.    Diane plans follow up at [place], has lactation support she used last time through the hospital there as well. Reinforce success with latch/feedings, bringing milk in; offer support and encouragement, answered questions. Family is ready for discharge this evening after car seat trial completed.

## 2023-10-29 NOTE — TELEPHONE ENCOUNTER
Diane is POD#4 s/p repeat CS and gestational hypertension. Was discharged yesterday with nifedipine 30mg BID and prazosin 1mg to take at bedtime for PTSD related to birth trauma. Last night took both medications and had symptoms of hypotension. Felt dizzy, /60. Ate cereal and retook BP a few times, eventually returned to normal and now at 12 hrs is 130s/80s. Discussed using am dose of nifedipine and if taking prazosin to hold pm dose of nifedipine. All questions answered will follow up next week for BP check.     Alycia Ward MD

## 2023-10-30 LAB
PATH REPORT.COMMENTS IMP SPEC: NORMAL
PATH REPORT.COMMENTS IMP SPEC: NORMAL
PATH REPORT.FINAL DX SPEC: NORMAL
PATH REPORT.GROSS SPEC: NORMAL
PATH REPORT.MICROSCOPIC SPEC OTHER STN: NORMAL
PATH REPORT.RELEVANT HX SPEC: NORMAL
PHOTO IMAGE: NORMAL

## 2023-10-31 ENCOUNTER — PATIENT OUTREACH (OUTPATIENT)
Dept: CARE COORDINATION | Facility: CLINIC | Age: 33
End: 2023-10-31
Payer: COMMERCIAL

## 2023-10-31 NOTE — PROGRESS NOTES
Clinic Care Coordination Contact  [facility]: Post-Discharge Note  SITUATION                                                      Admission:    Admission Date: 10/25/23   Reason for Admission: Maternity care  Discharge:   Discharge Date: 10/28/23  Discharge Diagnosis: Maternity care    BACKGROUND                                                      The patient's hospital course was notable for mood changes. Psychiatry was consulted and recommended addition of prazosin 1 mg at bedtime and continued outpatient follow up. From a blood pressure standpoint, her blood pressures were well controlled on 30 mg nifedipine XL BID.  She recovered as anticipated and experienced no post-operative complications. On discharge, her pain was well controlled. Vaginal bleeding is similar to peak menstrual flow.  Voiding without difficulty.  Ambulating well and tolerating a normal diet.  No fever or significant wound drainage.  Breastfeeding well.  Infant is stable.  Passing flatus.  She was discharged on post-partum day #3     ASSESSMENT           Discharge Assessment  How are you doing now that you are home?: Patient shares that she is doing well. Baby has had a check up and is doing great. Patient did have to get some blood pressure medications adjsted on when she took them, this is all figured out now. Patient will meet with mid-wife next week. No other questions/concerns or needs at this time.  How are your symptoms? (Red Flag symptoms escalate to triage hotline per guidelines): Improved  Do you feel your condition is stable enough to be safe at home until your provider visit?: Yes  Does the patient have their discharge instructions? : Yes  Does the patient have questions regarding their discharge instructions? : No  Were you started on any new medications or were there changes to any of your previous medications? : Yes  Does the patient have all of their medications?: Yes  Do you have questions regarding any of your  medications? : No  Do you have all of your needed medical supplies or equipment (DME)?  (i.e. oxygen tank, CPAP, cane, etc.): Yes  Discharge follow-up appointment scheduled within 14 calendar days? : Yes  Discharge Follow Up Appointment Date: 11/05/23  Discharge Follow Up Appointment Scheduled with?: Specialty Care Provider (Follow-up with midwife)    Post-op ([facility] Only)  If the patient had a surgery or procedure, do they have any questions for a nurse?: No    Post-op (Clinicians Only)  Did the patient have surgery or a procedure: Yes  Incision: closed  Drainage: No  Bleeding: light  Fever: No  Chills: No  Redness: No  Warmth: No  Swelling: No        PLAN                                                      Outpatient Plan:  Follow up with provider in 1 weeks and 6 weeks for post-delivery checks  Breast Pump DME As directed  DME Provider: [facility]  Start Date: 10/25/2023  Reason for Breast Pump: Z39.1 Postpartum Care and Examination of Lactating Mother      For any urgent concerns, please contact our 24 hour nurse triage line: [phone] ([phone])         HORACIO Sainz

## 2024-08-27 NOTE — PROGRESS NOTES
Maternal-Fetal Medicine Consultation     Anna Smith  : 1990  MRN: [id number]    Rerferral:  Anna Smith is a 33 year old sent by [facility] for MFM consultation.    HPI     Anna Smith is a 33 year old  at 37w4d by LMP consistent with 20w2d US here for MFM consultation regarding possible recent vertebral artery dissection following chiropractic session diagnosed after presenting with headache and hypertension. She is here alone.    Anna reports that in her first pregnancy, she had history of preeclampsia and had an emergency  section after getting an epidural that dropped her blood pressure and the fetal heart rate. She has been on LDA this pregnancy. She then had a  requiring a vacuum which was complicated by a 2L hemorrhage. She did have to go to the OR for exam under anesthesia and reported no uterine rupture. She has therefore transferred her care to [facility], which has been a more positive experience and she hopes to deliver at their birth center. 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    She has started to have back pain in this pregnancy, thus was referred to a chiropractor. However, she had severe posterior headache and neck pain after cervical manipulation. This headache is different than her prior headaches where are frontal and more consistent with migraine headaches. She called the chiropractor and was seen again, when she was noted to be hypertensive. She went to the ED the next day and was again hypertensive and diagnosed with vertebral artery dissection, although the imaging was reportedly unclear. She was told by neurology there that they believed it to be artifact, she could take aspirin, and no further neurology follow up was needed. There is not a neurology consult to review.    She denies current headache, vision changes, significant  Mariana Dumont has been provided discharge instructions, to include follow up care, home medications, and activity/diet reviewed. Understanding verbalized. No IV present. Arm band removed. Medications to be given from pharmacy. Pt ride present. Pt out of DCL at this time with personal belongings.     "edema or RUQ pain.    Prenatal Care:  Primary OB care this pregnancy has been with [facility].     OB History    Para Term  AB Living   4 2 1 1 1 2   SAB IAB Ectopic Multiple Live Births   1 0 0 0 2      # Outcome Date GA Lbr Ayan/2nd Weight Sex Delivery Anes PTL Lv   4 Current            3 Term 22 40w4d / 02:58 3.487 kg (7 lb 11 oz) M Vag-Vacuum EPI N CHRISSY      Complications: Hemorrhage      Name: ARPAN CISNEROS      Apgar1: 8  Apgar5: 9   2 SAB            1  18     CS-LTranv   CHRISSY     Gynecologic History   - Denies any history of abnormal pap smears  - Denies prior cervical surgery or procedures  - Denies any history of frequent UTIs, vaginal infections, or STIs    Past Medical History     Past Medical History:   Diagnosis Date    History of delivery by vacuum extraction, currently pregnant     History of postpartum hemorrhage     Vertebral artery dissection (H24)      Past Surgical History     Past Surgical History:   Procedure Laterality Date     SECTION       Medication List     LDA    Allergies   Patient has no allergy information on record.    Social History   Denies use of alcohol, drugs or smoking.    Family History     Denies history of genetic disorders, preeclampsia, thromboembolic disease, bleeding disorders, developmental delay.     Review of Systems   10-point ROS negative except as in HPI.    Physical Exam   BP (!) 143/95   Pulse 79   Resp 18   LMP 2023   SpO2 97%   Gen: NAD, tearful  CV: Warm, well perfused  Resp: Breathing comfortably on room air  Abd: Gravid, non-tender  Ext: No edema    Ultrasound   See today's ultrasound report under the \"imaging\" tab.    Other Imaging     23 CT Angio Head - FINDINGS:   The great vessels are patent. The common carotid arteries are patent. The   proximal ICAs are patent without signficant stenoses by NASCET criteria. The   more distal cervical ICAs are patent. The origins of the " vertebral arteries are   patent. The cervical segments of the vertebral arteries are patent.     IMPRESSION:   Patent cervical arterial vasculature without hemodynamically significant luminal   stenosis.     23 MR Angio - Findings:   The previously noted short segment focus of luminal narrowing at the V2/V3   junction of the right vertebral artery is not well appreciated on this   examinations time-of-flight imaging, given lack of thin postcontrast imaging.   There may be subtle luminal narrowing noted on the T1 weighted imaging series   6, image 5. No definite asymmetric T1 hyperintense crest enteric focus along   the course of the vertebral artery to suggest a mural hematoma.     The bilateral carotid and left vertebral arteries appear patent.     Impression:   1. Subtle short segment focal narrowing of the right vertebral artery at its   V2/V3 junction as noted on the prior CT which may represent short-segment   dissection. This is not well appreciated on time-of-flight imaging, given lack   of postcontrast thin sequences.   2. No definite asymmetric intrinsic T1 hyperintensity to suggest a mural   hematoma.     23 CT Head - FINDINGS: No acute hemorrhage or positive mass effect is demonstrated. The gray matter-white matter junction is preserved. The ventricles and other subarachnoid spaces are within normal limits. No calvarial abnormality is evident. The paranasal and mastoid sinuses are   clear.     IMPRESSION: Negative head CT.        Labs     Normal HELLP labs 2 weeks ago -   Hgb 13  Platelets 228  AST/ALT 25/27  Creatinine 0.76  TP/Cr 0.04     Assessment/Plan     Anna Smith is a 33 year old  at 37w4d by LMP consistent with 20w2d US here for MFM consultation regarding possible recent vertebral artery dissection following chiropractic session diagnosed after presenting with headache and hypertension. She has had recurrent mild range blood pressures here in the office and is to be sent to  triage for further evaluation and monitoring.    Vertebral Artery Dissection (VAD)  Vertebral artery dissection (VAD) can be extracranial or intracranial and unilateral or bilateral with different prognoses ranging from complete healing to serious neurological sequels. Dissection of cervical, vertebral, or carotid arteries is rare, but once occurring, it can be fatal. The main clinical presentation of VAD is typically acute, severe neck pain in the occipito-cervical region, either with or without headache. The incidence of spontaneous vertebral artery dissection ranges from 1 to 1.5 per 100.00, annually. 2.4% of symptomatic spontaneous VAD cases presented in the postpartum period and women are 2.5 times more likely than men to get VAD. VAD can lead to stroke with an estimated incidence of one per 100,000 individuals annually, and stroke can lead to VAD. Hormonal and mechanical factors might increase the risk of VAD during pregnancy and puerperium.    Identified predisposing factors of VAD include intimal injury related to Valsalva maneuvers during labor, and alterations in arterial wall integrity due to hormonal or vasoactive substances, in addition to an overall state of hypercoagulability. Hypertensive disorders of pregnancy additionally increases the risk in susceptible patients. Another possible condition that can lead to VAD is cervical spine manipulation. The risk related to the development of VAD is decidedly low if the manipulation maneuvers are carried out according to good clinical practice. Several vascular and connective tissue disorders have also been associated with dissection; in particular, migraines, fibromuscular hyperplasia, and vascular Janet-Danlos syndrome.     While in this case the diagnosis of vertebral artery dissection is not definitive based on imaging findings, patient's symptoms and presentation and recent history of neck manipulation are consistent with the diagnosis. The biggest  worry during the remainder of the pregnancy would be risk of recurrence or risk of further dissection, especially at time of delivery and immediately postpartum. It is difficult to quantity the risk of recurrent dissection with valsalva and thus the mode of delivery needs to be discussed and clarified with the neurology team. If there is felt to be significant risk of recurrence with valsalva, patient may require an assisted second stage or planned  delivery. Additionally, fluctuations in blood pressure may increase risk of further or repeat dissection and thus she requires close blood pressure monitoring and management.    Previous Pregnancy Complicated By Preeclampsia  A history of preeclampsia in the past confers a recurrence risk of up to 25-30%. A history of severe preeclampsia remote from term may have a recurrence risk of almost 70%.  Subsequent pregnancies in women with a history of severe preeclampsia or eclampsia are also at increased risk of other obstetric complications compared to women with no such history. These problems include:  placental abruption,  delivery, intrauterine growth restriction, and increased  mortality.  Low dose aspirin is recommended in women with previous preeclampsia to prevent recurrence.  It should be initiated late in the first trimester. Women who have experienced preeclampsia are at a lifelong increased risk for cardiovascular disease and healthy lifestyle should be emphasized.    Patient was noted to have recurrent mild range blood pressures today. She denies any additional signs/symptoms of preeclampsia at this time including no headache, vision changes, SOB, RUQ pain or significant edema. We discussed that a diagnosis of gestational hypertension or preeclampsia would alter delivery timing recommendations to 37 weeks gestation.    Recommendations:  Patient was recommended to present to triage for preeclampsia evaluation: HELLP labs, serial blood  pressure monitoring, fetal heart rate monitoring  Reviewed that if gestational hypertension or preeclampsia are diagnosed, delivery will be recommended  Inpatient team may consider neurology consultation while in triage  Recommend delivery in a hospital setting for close monitoring and management of blood pressures as well as for close monitoring of symptoms consistent with further or recurrent dissection. Hospital will allow for urgent imaging and subspecialty consultation should she have recurrent symptoms.  If discharged, then will need urgent neurology consultation outpatient to review her case and candidacy for valsalva/vaginal delivery.    The patient was seen and evaluated with Dr. Sarah Brock.    Signout given to inpatient team, John Mcgovern and [employer].    Melodie Olivares MD  Maternal Fetal Medicine Fellow      [facility] Attending Attestation  I have seen and evaluated the patient with Dr. Olivares. I reviewed her chart and agree with the above documented assessment and plan. I spent a total of 75 minutes on the date of this encounter including preparing to see the patient (reviewing medical records/tests), counseling and discussing the plan of care, documenting the visit in the electronic medical record, and communicating with other health care professionals and/or care coordination. Please see her note for specific details; I have made the necessary edits/additions.  The patient was also seen for an ultrasound in the [facility]etal [facility] at the [facility] today. For a detailed report of the ultrasound examination, please see the ultrasound report which can be found under the imaging tab.  Sarah Brock MD  Maternal Fetal Medicine Physician

## 2024-12-15 ENCOUNTER — HEALTH MAINTENANCE LETTER (OUTPATIENT)
Age: 34
End: 2024-12-15

## (undated) DEVICE — PACK C-SECTION LF PL15OTA83B

## (undated) DEVICE — SU VICRYL 4-0 PS-2 18" UND J496G

## (undated) DEVICE — PREP CHLORAPREP 26ML TINTED ORANGE  260815

## (undated) DEVICE — STRAP KNEE/BODY 31143004

## (undated) DEVICE — SU VICRYL 0 CT-1 36" J346H

## (undated) DEVICE — SOL NACL 0.9% IRRIG 1000ML BOTTLE 07138-09

## (undated) DEVICE — SU MONOCRYL 0 CT-1 36" Y346H

## (undated) DEVICE — GLOVE PROTEXIS BLUE W/NEU-THERA 7.0  2D73EB70

## (undated) DEVICE — CATH TRAY FOLEY 16FR SILICONE 907416

## (undated) DEVICE — GLOVE ESTEEM POWDER FREE SMT 6.5  2D72PT65

## (undated) DEVICE — STOCKING SLEEVE COMPRESSION CALF LG

## (undated) DEVICE — SOL WATER IRRIG 1000ML BOTTLE 07139-09

## (undated) RX ORDER — PHENYLEPHRINE HCL IN 0.9% NACL 50MG/250ML
PLASTIC BAG, INJECTION (ML) INTRAVENOUS
Status: DISPENSED
Start: 2023-10-25

## (undated) RX ORDER — EPINEPHRINE 1 MG/ML
INJECTION, SOLUTION, CONCENTRATE INTRAVENOUS
Status: DISPENSED
Start: 2023-10-25

## (undated) RX ORDER — FENTANYL CITRATE 50 UG/ML
INJECTION, SOLUTION INTRAMUSCULAR; INTRAVENOUS
Status: DISPENSED
Start: 2023-10-25

## (undated) RX ORDER — OXYTOCIN/0.9 % SODIUM CHLORIDE 30/500 ML
PLASTIC BAG, INJECTION (ML) INTRAVENOUS
Status: DISPENSED
Start: 2023-10-25

## (undated) RX ORDER — BUPIVACAINE HYDROCHLORIDE 2.5 MG/ML
INJECTION, SOLUTION EPIDURAL; INFILTRATION; INTRACAUDAL
Status: DISPENSED
Start: 2023-10-25

## (undated) RX ORDER — DEXMEDETOMIDINE HYDROCHLORIDE 4 UG/ML
INJECTION, SOLUTION INTRAVENOUS
Status: DISPENSED
Start: 2023-10-25

## (undated) RX ORDER — KETOROLAC TROMETHAMINE 30 MG/ML
INJECTION, SOLUTION INTRAMUSCULAR; INTRAVENOUS
Status: DISPENSED
Start: 2023-10-25

## (undated) RX ORDER — MORPHINE SULFATE 1 MG/ML
INJECTION, SOLUTION EPIDURAL; INTRATHECAL; INTRAVENOUS
Status: DISPENSED
Start: 2023-10-25

## (undated) RX ORDER — FENTANYL CITRATE-0.9 % NACL/PF 10 MCG/ML
PLASTIC BAG, INJECTION (ML) INTRAVENOUS
Status: DISPENSED
Start: 2023-10-25

## (undated) RX ORDER — ONDANSETRON 2 MG/ML
INJECTION INTRAMUSCULAR; INTRAVENOUS
Status: DISPENSED
Start: 2023-10-25